# Patient Record
Sex: FEMALE | Race: OTHER | HISPANIC OR LATINO | URBAN - METROPOLITAN AREA
[De-identification: names, ages, dates, MRNs, and addresses within clinical notes are randomized per-mention and may not be internally consistent; named-entity substitution may affect disease eponyms.]

---

## 2018-12-10 ENCOUNTER — OUTPATIENT (OUTPATIENT)
Dept: OUTPATIENT SERVICES | Facility: HOSPITAL | Age: 43
LOS: 1 days | Discharge: HOME | End: 2018-12-10
Payer: COMMERCIAL

## 2018-12-10 DIAGNOSIS — R76.11 NONSPECIFIC REACTION TO TUBERCULIN SKIN TEST WITHOUT ACTIVE TUBERCULOSIS: ICD-10-CM

## 2018-12-10 PROCEDURE — 71046 X-RAY EXAM CHEST 2 VIEWS: CPT | Mod: 26

## 2019-06-27 ENCOUNTER — EMERGENCY (EMERGENCY)
Facility: HOSPITAL | Age: 44
LOS: 0 days | Discharge: HOME | End: 2019-06-28
Attending: EMERGENCY MEDICINE
Payer: COMMERCIAL

## 2019-06-27 VITALS
HEART RATE: 79 BPM | OXYGEN SATURATION: 100 % | DIASTOLIC BLOOD PRESSURE: 89 MMHG | RESPIRATION RATE: 18 BRPM | TEMPERATURE: 98 F | SYSTOLIC BLOOD PRESSURE: 122 MMHG | HEIGHT: 59 IN | WEIGHT: 164.91 LBS

## 2019-06-27 DIAGNOSIS — R10.32 LEFT LOWER QUADRANT PAIN: ICD-10-CM

## 2019-06-27 DIAGNOSIS — D25.9 LEIOMYOMA OF UTERUS, UNSPECIFIED: ICD-10-CM

## 2019-06-27 PROCEDURE — 99285 EMERGENCY DEPT VISIT HI MDM: CPT

## 2019-06-28 VITALS
TEMPERATURE: 98 F | SYSTOLIC BLOOD PRESSURE: 121 MMHG | DIASTOLIC BLOOD PRESSURE: 77 MMHG | OXYGEN SATURATION: 98 % | RESPIRATION RATE: 16 BRPM | HEART RATE: 61 BPM

## 2019-06-28 LAB
ALBUMIN SERPL ELPH-MCNC: 3.4 G/DL — SIGNIFICANT CHANGE UP (ref 3.3–5)
ALP SERPL-CCNC: 69 U/L — SIGNIFICANT CHANGE UP (ref 40–120)
ALT FLD-CCNC: 41 U/L — SIGNIFICANT CHANGE UP (ref 12–78)
ANION GAP SERPL CALC-SCNC: 7 MMOL/L — SIGNIFICANT CHANGE UP (ref 5–17)
APPEARANCE UR: CLEAR — SIGNIFICANT CHANGE UP
APTT BLD: 29.5 SEC — SIGNIFICANT CHANGE UP (ref 27.5–36.3)
AST SERPL-CCNC: 29 U/L — SIGNIFICANT CHANGE UP (ref 15–37)
BASOPHILS # BLD AUTO: 0.08 K/UL — SIGNIFICANT CHANGE UP (ref 0–0.2)
BASOPHILS NFR BLD AUTO: 0.9 % — SIGNIFICANT CHANGE UP (ref 0–2)
BILIRUB SERPL-MCNC: 0.4 MG/DL — SIGNIFICANT CHANGE UP (ref 0.2–1.2)
BILIRUB UR-MCNC: NEGATIVE — SIGNIFICANT CHANGE UP
BUN SERPL-MCNC: 8 MG/DL — SIGNIFICANT CHANGE UP (ref 7–23)
CALCIUM SERPL-MCNC: 8.2 MG/DL — LOW (ref 8.5–10.1)
CHLORIDE SERPL-SCNC: 108 MMOL/L — SIGNIFICANT CHANGE UP (ref 96–108)
CO2 SERPL-SCNC: 24 MMOL/L — SIGNIFICANT CHANGE UP (ref 22–31)
COLOR SPEC: YELLOW — SIGNIFICANT CHANGE UP
CREAT SERPL-MCNC: 0.66 MG/DL — SIGNIFICANT CHANGE UP (ref 0.5–1.3)
DIFF PNL FLD: NEGATIVE — SIGNIFICANT CHANGE UP
EOSINOPHIL # BLD AUTO: 0.26 K/UL — SIGNIFICANT CHANGE UP (ref 0–0.5)
EOSINOPHIL NFR BLD AUTO: 2.8 % — SIGNIFICANT CHANGE UP (ref 0–6)
GLUCOSE SERPL-MCNC: 98 MG/DL — SIGNIFICANT CHANGE UP (ref 70–99)
GLUCOSE UR QL: NEGATIVE MG/DL — SIGNIFICANT CHANGE UP
HCG SERPL-ACNC: <1 MIU/ML — SIGNIFICANT CHANGE UP
HCT VFR BLD CALC: 42.1 % — SIGNIFICANT CHANGE UP (ref 34.5–45)
HGB BLD-MCNC: 13.8 G/DL — SIGNIFICANT CHANGE UP (ref 11.5–15.5)
IMM GRANULOCYTES NFR BLD AUTO: 0.2 % — SIGNIFICANT CHANGE UP (ref 0–1.5)
INR BLD: 0.94 RATIO — SIGNIFICANT CHANGE UP (ref 0.88–1.16)
KETONES UR-MCNC: NEGATIVE — SIGNIFICANT CHANGE UP
LACTATE SERPL-SCNC: 2 MMOL/L — SIGNIFICANT CHANGE UP (ref 0.7–2)
LEUKOCYTE ESTERASE UR-ACNC: NEGATIVE — SIGNIFICANT CHANGE UP
LYMPHOCYTES # BLD AUTO: 4.07 K/UL — HIGH (ref 1–3.3)
LYMPHOCYTES # BLD AUTO: 44.5 % — HIGH (ref 13–44)
MCHC RBC-ENTMCNC: 30.3 PG — SIGNIFICANT CHANGE UP (ref 27–34)
MCHC RBC-ENTMCNC: 32.8 GM/DL — SIGNIFICANT CHANGE UP (ref 32–36)
MCV RBC AUTO: 92.3 FL — SIGNIFICANT CHANGE UP (ref 80–100)
MONOCYTES # BLD AUTO: 0.53 K/UL — SIGNIFICANT CHANGE UP (ref 0–0.9)
MONOCYTES NFR BLD AUTO: 5.8 % — SIGNIFICANT CHANGE UP (ref 2–14)
NEUTROPHILS # BLD AUTO: 4.19 K/UL — SIGNIFICANT CHANGE UP (ref 1.8–7.4)
NEUTROPHILS NFR BLD AUTO: 45.8 % — SIGNIFICANT CHANGE UP (ref 43–77)
NITRITE UR-MCNC: NEGATIVE — SIGNIFICANT CHANGE UP
NRBC # BLD: 0 /100 WBCS — SIGNIFICANT CHANGE UP (ref 0–0)
PH UR: 6 — SIGNIFICANT CHANGE UP (ref 5–8)
PLATELET # BLD AUTO: 236 K/UL — SIGNIFICANT CHANGE UP (ref 150–400)
POTASSIUM SERPL-MCNC: 3.8 MMOL/L — SIGNIFICANT CHANGE UP (ref 3.5–5.3)
POTASSIUM SERPL-SCNC: 3.8 MMOL/L — SIGNIFICANT CHANGE UP (ref 3.5–5.3)
PROT SERPL-MCNC: 7.1 GM/DL — SIGNIFICANT CHANGE UP (ref 6–8.3)
PROT UR-MCNC: NEGATIVE MG/DL — SIGNIFICANT CHANGE UP
PROTHROM AB SERPL-ACNC: 10.5 SEC — SIGNIFICANT CHANGE UP (ref 10–12.9)
RBC # BLD: 4.56 M/UL — SIGNIFICANT CHANGE UP (ref 3.8–5.2)
RBC # FLD: 13.2 % — SIGNIFICANT CHANGE UP (ref 10.3–14.5)
SODIUM SERPL-SCNC: 139 MMOL/L — SIGNIFICANT CHANGE UP (ref 135–145)
SP GR SPEC: 1.01 — SIGNIFICANT CHANGE UP (ref 1.01–1.02)
UROBILINOGEN FLD QL: NEGATIVE MG/DL — SIGNIFICANT CHANGE UP
WBC # BLD: 9.15 K/UL — SIGNIFICANT CHANGE UP (ref 3.8–10.5)
WBC # FLD AUTO: 9.15 K/UL — SIGNIFICANT CHANGE UP (ref 3.8–10.5)

## 2019-06-28 PROCEDURE — 74177 CT ABD & PELVIS W/CONTRAST: CPT | Mod: 26

## 2019-06-28 RX ORDER — MORPHINE SULFATE 50 MG/1
4 CAPSULE, EXTENDED RELEASE ORAL ONCE
Refills: 0 | Status: DISCONTINUED | OUTPATIENT
Start: 2019-06-28 | End: 2019-06-28

## 2019-06-28 RX ORDER — KETOROLAC TROMETHAMINE 30 MG/ML
30 SYRINGE (ML) INJECTION ONCE
Refills: 0 | Status: DISCONTINUED | OUTPATIENT
Start: 2019-06-28 | End: 2019-06-28

## 2019-06-28 RX ORDER — ONDANSETRON 8 MG/1
4 TABLET, FILM COATED ORAL ONCE
Refills: 0 | Status: COMPLETED | OUTPATIENT
Start: 2019-06-28 | End: 2019-06-28

## 2019-06-28 RX ADMIN — Medication 30 MILLIGRAM(S): at 03:00

## 2019-06-28 RX ADMIN — MORPHINE SULFATE 4 MILLIGRAM(S): 50 CAPSULE, EXTENDED RELEASE ORAL at 03:00

## 2019-06-28 RX ADMIN — ONDANSETRON 4 MILLIGRAM(S): 8 TABLET, FILM COATED ORAL at 02:47

## 2019-06-28 RX ADMIN — Medication 30 MILLIGRAM(S): at 02:47

## 2019-06-28 RX ADMIN — MORPHINE SULFATE 4 MILLIGRAM(S): 50 CAPSULE, EXTENDED RELEASE ORAL at 02:48

## 2019-06-28 NOTE — ED PROVIDER NOTE - CLINICAL SUMMARY MEDICAL DECISION MAKING FREE TEXT BOX
Pt VSS, labs & UA neg for acute pathology.  CT neg for acute pathology.  Discussed results and outcome of testing with the patient, given copy as well.  Patient advised to please follow up with their primary care doctor within the next 24 hours and return to the Emergency Department for worsening symptoms or any other concerns.  Patient advised that their doctor may call  to follow up on the specific results of the tests performed today in the emergency department.

## 2019-06-28 NOTE — ED PROVIDER NOTE - OBJECTIVE STATEMENT
Pertinent PMH/PSH/FHx/SHx and Review of Systems contained within:    44yo F w no significant PMH, hx of , presents to ED c/o L sided abd pain.  Pt states she feels burning pain over L side of abdomen & flank.  Denies fever, chills, vomiting, diarrhea.  Pt states she does feel constipated.  Denies hematuria, dysuria.  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    No fever/chills, No photophobia/eye pain/changes in vision, No ear pain/sore throat/dysphagia, No chest pain/palpitations, no SOB/cough/wheeze/stridor, +abdominal pain, No neck/back pain, no rash, no changes in neurological status/function.

## 2019-06-28 NOTE — ED PROVIDER NOTE - PHYSICAL EXAMINATION
Gen: Alert, c/o pain  Head: NC, AT, EOMI, normal lids/conjunctiva  ENT: normal hearing, patent oropharynx, MMM  Neck: supple, no tenderness/meningismus, FROM, Trachea midline  Pulm: Bilateral clear BS, normal resp effort, no wheeze/stridor/retractions  CV: RRR, no M/R/G, +dist pulses  Abd: soft, +TTP over LUQ & LLQ, ND, +BS, no guarding/rebound tenderness  Mskel: no edema/erythema/cyanosis  Skin: no rash  Neuro: no sensory/motor deficits

## 2019-06-29 LAB
CULTURE RESULTS: SIGNIFICANT CHANGE UP
SPECIMEN SOURCE: SIGNIFICANT CHANGE UP

## 2019-10-28 ENCOUNTER — APPOINTMENT (OUTPATIENT)
Dept: RADIOLOGY | Facility: HOSPITAL | Age: 44
End: 2019-10-28

## 2019-10-28 ENCOUNTER — OUTPATIENT (OUTPATIENT)
Dept: OUTPATIENT SERVICES | Facility: HOSPITAL | Age: 44
LOS: 1 days | Discharge: HOME | End: 2019-10-28
Payer: COMMERCIAL

## 2019-10-28 ENCOUNTER — APPOINTMENT (OUTPATIENT)
Dept: ULTRASOUND IMAGING | Facility: HOSPITAL | Age: 44
End: 2019-10-28

## 2019-10-28 DIAGNOSIS — Z12.11 ENCOUNTER FOR SCREENING FOR MALIGNANT NEOPLASM OF COLON: ICD-10-CM

## 2019-10-28 DIAGNOSIS — R10.9 UNSPECIFIED ABDOMINAL PAIN: ICD-10-CM

## 2019-10-28 PROCEDURE — 76700 US EXAM ABDOM COMPLETE: CPT | Mod: 26

## 2019-10-28 PROCEDURE — 74270 X-RAY XM COLON 1CNTRST STD: CPT | Mod: 26

## 2020-03-29 ENCOUNTER — EMERGENCY (EMERGENCY)
Facility: HOSPITAL | Age: 45
LOS: 0 days | Discharge: HOME | End: 2020-03-29
Payer: MEDICAID

## 2020-03-29 VITALS
HEIGHT: 62 IN | TEMPERATURE: 99 F | WEIGHT: 145.06 LBS | SYSTOLIC BLOOD PRESSURE: 133 MMHG | HEART RATE: 84 BPM | OXYGEN SATURATION: 99 % | DIASTOLIC BLOOD PRESSURE: 96 MMHG | RESPIRATION RATE: 16 BRPM

## 2020-03-29 DIAGNOSIS — R05 COUGH: ICD-10-CM

## 2020-03-29 DIAGNOSIS — J02.9 ACUTE PHARYNGITIS, UNSPECIFIED: ICD-10-CM

## 2020-03-29 DIAGNOSIS — M54.9 DORSALGIA, UNSPECIFIED: ICD-10-CM

## 2020-03-29 PROCEDURE — 99283 EMERGENCY DEPT VISIT LOW MDM: CPT

## 2020-03-29 RX ORDER — AZITHROMYCIN 500 MG/1
1 TABLET, FILM COATED ORAL
Qty: 6 | Refills: 0
Start: 2020-03-29

## 2020-03-29 NOTE — ED PROVIDER NOTE - CLINICAL SUMMARY MEDICAL DECISION MAKING FREE TEXT BOX
Lance Watkins, f/u w/PMD Zpack, Tylenol, f/u w/PMD  Pt is told the likelihood of having corona virus is high risk  base on symptoms. And to self quarantine for 14 days. Take tylenols prn for fever or pain, albuterol prn for chest tightness, zpack is use for clinical trail currently for treatement of covid19.

## 2020-03-29 NOTE — ED PROVIDER NOTE - PATIENT PORTAL LINK FT
You can access the FollowMyHealth Patient Portal offered by [facility] by registering at the following website: [email or web address]. By joining [employer]’s FollowMyHealth portal, you will also be able to view your health information using other applications (apps) compatible with our system.

## 2020-03-29 NOTE — ED PROVIDER NOTE - OBJECTIVE STATEMENT
Pt is a 44 year old female w/no significant PMH who presents to the ED today for a fever. Pt c/o of body aches. Denies LOC, N/V/D, dizziness, back pain or abdominal pain. Patient denies EtOH/tobacco/illicit substance use. Pt is a 44 year old female w/no significant PMH who presents to the ED today for back pain. Pt c/o sore throat and body ache. Pt reports having minor surgery x 1 week ago. Patient denies EtOH/tobacco/illicit substance use. Pt is a 44 year old female w/no significant PMH who presents to the ED today for back pain. Pt c/o sore throat and body ache. Pt reports having minor surgery x 1 week ago. Patient denies EtOH/tobacco/illicit substance use.  [employer] interper used [id number]

## 2020-12-03 NOTE — ED ADULT NURSE NOTE - CHPI ED NUR SYMPTOMS POS
Notify patient that I have sent an antibiotic to pharmacy.  He has a work note in his electronic medical record.  May return to work on December 7, 2020.  We will still need to isolate/quarantine through the weekend.   NAUSEA/VOMITING

## 2021-03-23 PROBLEM — Z00.00 ENCOUNTER FOR PREVENTIVE HEALTH EXAMINATION: Status: ACTIVE | Noted: 2021-03-23

## 2021-03-29 ENCOUNTER — APPOINTMENT (OUTPATIENT)
Dept: SURGERY | Facility: CLINIC | Age: 46
End: 2021-03-29
Payer: COMMERCIAL

## 2021-03-29 VITALS
HEIGHT: 62 IN | HEART RATE: 87 BPM | SYSTOLIC BLOOD PRESSURE: 131 MMHG | BODY MASS INDEX: 30.55 KG/M2 | DIASTOLIC BLOOD PRESSURE: 84 MMHG | OXYGEN SATURATION: 99 % | TEMPERATURE: 98.1 F | WEIGHT: 166 LBS

## 2021-03-29 DIAGNOSIS — K82.9 DISEASE OF GALLBLADDER, UNSPECIFIED: ICD-10-CM

## 2021-03-29 DIAGNOSIS — Z86.11 PERSONAL HISTORY OF TUBERCULOSIS: ICD-10-CM

## 2021-03-29 DIAGNOSIS — Z78.9 OTHER SPECIFIED HEALTH STATUS: ICD-10-CM

## 2021-03-29 PROCEDURE — 99204 OFFICE O/P NEW MOD 45 MIN: CPT

## 2021-03-29 PROCEDURE — 99072 ADDL SUPL MATRL&STAF TM PHE: CPT

## 2021-04-01 PROBLEM — Z86.11 HISTORY OF TUBERCULOSIS: Status: RESOLVED | Noted: 2021-03-29 | Resolved: 2021-04-01

## 2021-04-01 PROBLEM — Z78.9 NON-SMOKER: Status: ACTIVE | Noted: 2021-03-29

## 2021-04-01 RX ORDER — BISACODYL 5 MG/1
5 TABLET ORAL
Refills: 0 | Status: ACTIVE | COMMUNITY

## 2021-04-06 PROBLEM — K82.9 GALLBLADDER DISEASE: Status: ACTIVE | Noted: 2021-04-06

## 2021-04-06 NOTE — CONSULT LETTER
[Dear  ___] : Dear  [unfilled], [Consult Letter:] : I had the pleasure of evaluating your patient, [unfilled]. [Please see my note below.] : Please see my note below. [Consult Closing:] : Thank you very much for allowing me to participate in the care of this patient.  If you have any questions, please do not hesitate to contact me. [Sincerely,] : Sincerely, [FreeTextEntry3] : Devyn Matias MD, FACS

## 2021-04-06 NOTE — HISTORY OF PRESENT ILLNESS
[de-identified] :   Ms. RADHA ONEIL is a 45 year  old patient who was referred by Dr. Deidra Dc with the chief complaint of having  epigastric pain for 3-4 years. Pain is  radiating to the back. She reports no nausea or vomiting and no history of jaundice, acholia or choluria.   Appetite is good and weight is stable.   She   has  family history of biliary tract disease in her father, mother and uncle.    She had an abdominal sonogram on  03/21/2020 which did not  reveal  GB stones. CBD is normal . patient also had abdominal US on 09/26/2020 that showed fatty liver. GB was normal.  EGD An colonoscopy  was done on 08/20/2018. EGD  showed erythematous mucosa and colonoscopy showed hemorrhoids, polyps and diverticulosis.

## 2021-04-06 NOTE — DATA REVIEWED
[FreeTextEntry1] : RADHA LAZAR\par Date of Birth\par 1975\par Procedure\par US ABDOMEN\par Study Date & Time\par 2020-03-21 2:09 PM\par Patient ID\par [id number]\par Accession Number\par [id number]\par Referring Physician\par RONALD WEINSTEIN\par Institution Name\par [facility]\par Report\par RADIOLOGY REPORT\par FINDINGS\par FINDING\par US ABDOMEN\par History: Abdominal pain.\par Comparison: None available.\par Technique: High resolution gray-scale sonographic evaluation of the abdomen was performed.\par FINDINGS:\par The liver is normal in size. No focal hepatic mass is identified. Liver parenchyma is echogenic.\par The gallbladder is distended without gallstones, gallbladder wall thickening or pericholecystic\par fluid.\par The common bile duct measures 4 mm, which is of normal caliber.\par The spleen measures 7.9 cm in length, which is normal in size.\par The right kidney measures 10.7 cm length. No evidence of hydronephrosis, perinephric fluid or renal\par calculus is noted.\par The left kidney measures 11.5 cm length. No evidence of hydronephrosis, perinephric fluid or renal\par calculus is noted.\par The visualized portion of the pancreas are within normal limits. The visualized portion of the\par abdominal aorta and IVC are unremarkable.\par IMPRESSION:\par Liver parenchyma is echogenic, which may be secondary to fatty infiltration and/or hepatic\par parenchymal disease.\par Otherwise unremarkable exam.\par Electronically signed by: Magdiel Watts MD 3/22/2020 8:29 AM\par  \par Electronically Signed By: Magdiel Watts MD\par Sign Date: 22-MAR-20[facility]

## 2021-04-06 NOTE — PLAN
[FreeTextEntry1] : Ms. JANEL ONEIL  was told significance of findings, options, risks and benefits were explained.    All surgical options were discussed including non-surgical treatments.  she was advise to repeat US and return after the test. \par Patient advised to seek immediate medical attention with any acute change in symptoms or with the development of any new or worsening symptoms.  Patient's questions and concerns addressed to patient's satisfaction, and patient verbalized an understanding of the information discussed.\par \par

## 2021-04-06 NOTE — PHYSICAL EXAM
[Alert] : alert [Oriented to Person] : oriented to person [Oriented to Place] : oriented to place [Oriented to Time] : oriented to time [Calm] : calm [Abdominal Masses] : No abdominal masses [Abdomen Tenderness] : ~T ~M No abdominal tenderness [de-identified] : She  is alert, well-groomed, and in NAD\par   [de-identified] : anicteric.  Nasal mucosa pink, septum midline. Oral mucosa pink.  Tongue midline, Pharynx without exudates.\par   [de-identified] : Neck supple. Trachea midline. Thyroid isthmus barely palpable, lobes not felt.\par   [de-identified] : midline scar

## 2021-04-12 ENCOUNTER — APPOINTMENT (OUTPATIENT)
Dept: SURGERY | Facility: CLINIC | Age: 46
End: 2021-04-12
Payer: COMMERCIAL

## 2021-04-12 VITALS
HEART RATE: 73 BPM | HEIGHT: 62 IN | OXYGEN SATURATION: 99 % | TEMPERATURE: 97.9 F | WEIGHT: 166 LBS | SYSTOLIC BLOOD PRESSURE: 139 MMHG | DIASTOLIC BLOOD PRESSURE: 89 MMHG | BODY MASS INDEX: 30.55 KG/M2

## 2021-04-12 PROCEDURE — 99213 OFFICE O/P EST LOW 20 MIN: CPT

## 2021-04-12 PROCEDURE — 99072 ADDL SUPL MATRL&STAF TM PHE: CPT

## 2021-04-12 NOTE — HISTORY OF PRESENT ILLNESS
[de-identified] :   Ms. RADHA ONEIL is a 45 year old patient who was referred by Dr. Deidra Dc with the chief complaint of having epigastric pain for 3-4 years. Pain is radiating to the back. She had an abdominal sonogram on  03/21/2020 which did not  reveal  GB stones. CBD is normal.  patient also had abdominal US on 09/26/2020 that showed fatty liver. GB was normal. Ms. JANEL ONEIL  is s/p abdominal US on 04/02/2021  and it showed Echogenic liver suggesting fatty infiltration versus hepatocellular disease.   Contracted gallbladder without evidence of calculus or wall thickening. patient continues to have abdominal pain. \par

## 2021-04-12 NOTE — PLAN
[FreeTextEntry1] : Ms. JANEL ONEIL  is presenting  today for an evaluation .   Results of  her recent  imaging and physical examination findings were discussed in details.   She  was advised to have HIDA scan and return after the tests.    Patient's questions and concerns addressed to patient's satisfaction.\par

## 2021-04-12 NOTE — PHYSICAL EXAM
[Abdominal Masses] : No abdominal masses [Abdomen Tenderness] : ~T ~M No abdominal tenderness [Alert] : alert [Oriented to Person] : oriented to person [Oriented to Place] : oriented to place [Oriented to Time] : oriented to time [Calm] : calm [de-identified] : She  is alert, well-groomed, and in NAD\par   [de-identified] : anicteric.  Nasal mucosa pink, septum midline. Oral mucosa pink.  Tongue midline, Pharynx without exudates.\par   [de-identified] : Neck supple. Trachea midline. Thyroid isthmus barely palpable, lobes not felt.\par   [de-identified] : midline scar

## 2021-04-12 NOTE — DATA REVIEWED
[FreeTextEntry1] : RADHA LAZAR\par Date of Birth\par 1975\par Procedure\par US ABDOMEN\par Study Date & Time\par 2021-04-02 3:26 PM\par Patient ID\par [id number]\par Accession Number\par [id number]\par Referring Physician\par DORINDA JACINTO\par Institution Name\par [facility]\par Report\par RADIOLOGY REPORT\par FINDINGS\par FINDING\par Ultrasound of the Abdomen.\par Reason for examination : Unspecified abdominal pain\par Comparison: Ultrasound 3/21/2020\par Sonographic evaluation of the upper abdomen reveals :\par MEASUREMENTS:\par CBD:3 mm\par SPLEEN:8.0 cm\par RIGHT KIDNEY:10.8 cm\par LEFT KIDNEY:11.4 cm\par LIVER: Mildly echogenic liver without evidence of focal mass .\par GALL BLADDER : Contracted gallbladder without evidence of calculus or wall thickening.\par BILE DUCTS: Without dilatation.\par PANCREAS : Limited evaluation secondary to overlying bowel gas.\par SPLEEN: Unremarkable.\par KIDNEYS: There is no calculus or hydronephrosis.\par ASCITES : None\par VASCULAR : Proximal aorta and IVC are unremarkable.\par IMPRESSION:\par  \par 1. Echogenic liver suggesting fatty infiltration versus hepatocellular disease.\par Electronically signed by: Husam Cassidy MD 4/2/2021 5:12 PM\par Workstation: [id number]\par Electronically Signed By: Husam Cassidy MD\par Sign Date: 02-APR-21\par [facility]

## 2021-04-12 NOTE — REVIEW OF SYSTEMS
[Abdominal Pain] : abdominal pain [Vomiting] : no vomiting [Constipation] : constipation [Diarrhea] : no diarrhea [Heartburn] : no heartburn [Melena] : no melena [Negative] : Heme/Lymph

## 2021-06-07 ENCOUNTER — APPOINTMENT (OUTPATIENT)
Dept: SURGERY | Facility: CLINIC | Age: 46
End: 2021-06-07
Payer: COMMERCIAL

## 2021-06-07 VITALS
SYSTOLIC BLOOD PRESSURE: 124 MMHG | BODY MASS INDEX: 30.55 KG/M2 | TEMPERATURE: 97.9 F | DIASTOLIC BLOOD PRESSURE: 84 MMHG | HEART RATE: 70 BPM | WEIGHT: 166 LBS | HEIGHT: 62 IN | OXYGEN SATURATION: 99 %

## 2021-06-07 PROCEDURE — 99213 OFFICE O/P EST LOW 20 MIN: CPT

## 2021-06-07 NOTE — PLAN
[FreeTextEntry1] : Ms. JANEL ONEIL  was told significance of findings, options, risks and benefits were explained.  Informed consent for laparoscopic/possible open  cholecystectomy  and potential risks, benefits and alternatives (surgical options were discussed including non-surgical options or the option of no surgery) to the planned surgery were discussed in depth.  All surgical options were discussed including non-surgical treatments.  She wishes to proceed with surgery.  We will plan for surgery on at the next available date, pending any required insurance pre-certification or pre-approval. She agrees to obtain any necessary pre-operative evaluations and testing prior to surgery. Patient instructed to maintain a fat-free diet, and to seek immediate medical attention with any acute change or worsening of symptoms, including but not limited to abdominal pain, fever, chills, nausea, vomiting, or yellowing of the skin. \par Patient advised to seek immediate medical attention with any acute change in symptoms or with the development of any new or worsening symptoms.  Patient's questions and concerns addressed to patient's satisfaction, and patient verbalized an understanding of the information discussed.

## 2021-06-07 NOTE — HISTORY OF PRESENT ILLNESS
[de-identified] :   Ms. RADHA ONEIL is a 45 year old patient who was referred by Dr. Deidra Dc with the chief complaint of having epigastric pain for 3-4 years. Pain is radiating to the back. She had an abdominal sonogram on  03/21/2020 which did not  reveal  GB stones. CBD is normal.  patient also had abdominal US on 09/26/2020 that showed fatty liver. GB was normal. Ms. JANEL ONEIL  is s/p abdominal US on 04/02/2021  and it showed Echogenic liver suggesting fatty infiltration versus hepatocellular disease. GB was normal. Ms. JANEL ONEIL  is s/p HIDA scan with EF on 05/11/2021. results showed 32.2 % EF. Normal value  is more or equal 38 % at 60 minutes.  patient continues to have right upper quadrant and epigastric pain \par

## 2021-06-07 NOTE — PHYSICAL EXAM
[Abdominal Masses] : No abdominal masses [Abdomen Tenderness] : ~T ~M No abdominal tenderness [Alert] : alert [Oriented to Person] : oriented to person [Oriented to Place] : oriented to place [Oriented to Time] : oriented to time [Calm] : calm [de-identified] : She  is alert, well-groomed, and in NAD\par   [de-identified] : anicteric.  Nasal mucosa pink, septum midline. Oral mucosa pink.  Tongue midline, Pharynx without exudates.\par   [de-identified] : Neck supple. Trachea midline. Thyroid isthmus barely palpable, lobes not felt.\par   [de-identified] : midline scar

## 2021-06-18 DIAGNOSIS — Z01.818 ENCOUNTER FOR OTHER PREPROCEDURAL EXAMINATION: ICD-10-CM

## 2021-06-19 ENCOUNTER — APPOINTMENT (OUTPATIENT)
Dept: DISASTER EMERGENCY | Facility: CLINIC | Age: 46
End: 2021-06-19

## 2021-06-21 ENCOUNTER — TRANSCRIPTION ENCOUNTER (OUTPATIENT)
Age: 46
End: 2021-06-21

## 2021-06-21 ENCOUNTER — OUTPATIENT (OUTPATIENT)
Dept: OUTPATIENT SERVICES | Facility: HOSPITAL | Age: 46
LOS: 1 days | End: 2021-06-21
Payer: COMMERCIAL

## 2021-06-21 VITALS
TEMPERATURE: 99 F | RESPIRATION RATE: 16 BRPM | HEIGHT: 61 IN | OXYGEN SATURATION: 99 % | HEART RATE: 81 BPM | DIASTOLIC BLOOD PRESSURE: 81 MMHG | SYSTOLIC BLOOD PRESSURE: 119 MMHG | WEIGHT: 162.04 LBS

## 2021-06-21 DIAGNOSIS — Z01.818 ENCOUNTER FOR OTHER PREPROCEDURAL EXAMINATION: ICD-10-CM

## 2021-06-21 DIAGNOSIS — Z98.891 HISTORY OF UTERINE SCAR FROM PREVIOUS SURGERY: Chronic | ICD-10-CM

## 2021-06-21 DIAGNOSIS — K82.8 OTHER SPECIFIED DISEASES OF GALLBLADDER: ICD-10-CM

## 2021-06-21 DIAGNOSIS — Z29.9 ENCOUNTER FOR PROPHYLACTIC MEASURES, UNSPECIFIED: ICD-10-CM

## 2021-06-21 DIAGNOSIS — Z98.890 OTHER SPECIFIED POSTPROCEDURAL STATES: Chronic | ICD-10-CM

## 2021-06-21 LAB — BLD GP AB SCN SERPL QL: SIGNIFICANT CHANGE UP

## 2021-06-21 PROCEDURE — 47563 LAPARO CHOLECYSTECTOMY/GRAPH: CPT

## 2021-06-21 PROCEDURE — G0463: CPT

## 2021-06-21 NOTE — H&P PST ADULT - NSICDXPROBLEM_GEN_ALL_CORE_FT
PROBLEM DIAGNOSES  Problem: Other specified diseases of gallbladder  Assessment and Plan: Laparoscopic Cholecystectomy with Intra-Operative Cholangiogram  Possible Open    Problem: Need for prophylactic measure  Assessment and Plan: Instruction regarding chlorhexidine soap use is given.  Patient verbalized understanding.  Literature also given

## 2021-06-21 NOTE — H&P PST ADULT - ASSESSMENT
44 yo female is scheduled for : Laparoscopic Cholecystectomy with Intra-Operative Cholangiogram  Possible Open, on 6/22/21

## 2021-06-21 NOTE — H&P PST ADULT - NSICDXFAMILYHX_GEN_ALL_CORE_FT
FAMILY HISTORY:  Father  Still living? Yes, Estimated age: Age Unknown  FH: arthritis, Age at diagnosis: Age Unknown

## 2021-06-21 NOTE — H&P PST ADULT - REASON FOR ADMISSION
I have had pain in the right upper quadrant of my abdomen for approximately 2 months, associated with nausea

## 2021-06-21 NOTE — H&P PST ADULT - HISTORY OF PRESENT ILLNESS
46 yo female with history of HLD, Seasonal Allergies, Arthritis, Gastritis, reports the above.  She is scheduled for : Laparoscopic Cholecystectomy with Intra-Operative Cholangiogram  Possible Open, on 6/22/21

## 2021-06-22 ENCOUNTER — OUTPATIENT (OUTPATIENT)
Dept: OUTPATIENT SERVICES | Facility: HOSPITAL | Age: 46
LOS: 1 days | End: 2021-06-22
Payer: COMMERCIAL

## 2021-06-22 ENCOUNTER — APPOINTMENT (OUTPATIENT)
Dept: SURGERY | Facility: HOSPITAL | Age: 46
End: 2021-06-22
Payer: COMMERCIAL

## 2021-06-22 ENCOUNTER — RESULT REVIEW (OUTPATIENT)
Age: 46
End: 2021-06-22

## 2021-06-22 VITALS
DIASTOLIC BLOOD PRESSURE: 75 MMHG | TEMPERATURE: 97 F | OXYGEN SATURATION: 99 % | SYSTOLIC BLOOD PRESSURE: 113 MMHG | RESPIRATION RATE: 16 BRPM | WEIGHT: 162.04 LBS | HEART RATE: 70 BPM | HEIGHT: 61 IN

## 2021-06-22 VITALS
SYSTOLIC BLOOD PRESSURE: 125 MMHG | RESPIRATION RATE: 16 BRPM | OXYGEN SATURATION: 100 % | DIASTOLIC BLOOD PRESSURE: 77 MMHG | TEMPERATURE: 98 F | HEART RATE: 65 BPM

## 2021-06-22 DIAGNOSIS — Z98.891 HISTORY OF UTERINE SCAR FROM PREVIOUS SURGERY: Chronic | ICD-10-CM

## 2021-06-22 DIAGNOSIS — K82.8 OTHER SPECIFIED DISEASES OF GALLBLADDER: ICD-10-CM

## 2021-06-22 DIAGNOSIS — Z98.890 OTHER SPECIFIED POSTPROCEDURAL STATES: Chronic | ICD-10-CM

## 2021-06-22 LAB
ABO RH CONFIRMATION: SIGNIFICANT CHANGE UP
HCG UR QL: NEGATIVE — SIGNIFICANT CHANGE UP

## 2021-06-22 PROCEDURE — 88304 TISSUE EXAM BY PATHOLOGIST: CPT

## 2021-06-22 PROCEDURE — 81025 URINE PREGNANCY TEST: CPT

## 2021-06-22 PROCEDURE — 47563 LAPARO CHOLECYSTECTOMY/GRAPH: CPT

## 2021-06-22 PROCEDURE — 88304 TISSUE EXAM BY PATHOLOGIST: CPT | Mod: 26

## 2021-06-22 PROCEDURE — 36415 COLL VENOUS BLD VENIPUNCTURE: CPT

## 2021-06-22 PROCEDURE — 47563 LAPARO CHOLECYSTECTOMY/GRAPH: CPT | Mod: AS

## 2021-06-22 PROCEDURE — 76000 FLUOROSCOPY <1 HR PHYS/QHP: CPT

## 2021-06-22 RX ORDER — SODIUM CHLORIDE 9 MG/ML
3 INJECTION INTRAMUSCULAR; INTRAVENOUS; SUBCUTANEOUS EVERY 8 HOURS
Refills: 0 | Status: DISCONTINUED | OUTPATIENT
Start: 2021-06-22 | End: 2021-06-22

## 2021-06-22 RX ORDER — FENTANYL CITRATE 50 UG/ML
50 INJECTION INTRAVENOUS
Refills: 0 | Status: DISCONTINUED | OUTPATIENT
Start: 2021-06-22 | End: 2021-06-22

## 2021-06-22 RX ORDER — OXYCODONE AND ACETAMINOPHEN 5; 325 MG/1; MG/1
1 TABLET ORAL ONCE
Refills: 0 | Status: DISCONTINUED | OUTPATIENT
Start: 2021-06-22 | End: 2021-06-22

## 2021-06-22 RX ORDER — ONDANSETRON 8 MG/1
4 TABLET, FILM COATED ORAL ONCE
Refills: 0 | Status: DISCONTINUED | OUTPATIENT
Start: 2021-06-22 | End: 2021-06-29

## 2021-06-22 RX ORDER — FENTANYL CITRATE 50 UG/ML
25 INJECTION INTRAVENOUS
Refills: 0 | Status: DISCONTINUED | OUTPATIENT
Start: 2021-06-22 | End: 2021-06-22

## 2021-06-22 RX ADMIN — FENTANYL CITRATE 50 MICROGRAM(S): 50 INJECTION INTRAVENOUS at 11:01

## 2021-06-22 RX ADMIN — SODIUM CHLORIDE 3 MILLILITER(S): 9 INJECTION INTRAMUSCULAR; INTRAVENOUS; SUBCUTANEOUS at 08:01

## 2021-06-22 RX ADMIN — FENTANYL CITRATE 25 MICROGRAM(S): 50 INJECTION INTRAVENOUS at 11:10

## 2021-06-22 RX ADMIN — OXYCODONE AND ACETAMINOPHEN 1 TABLET(S): 5; 325 TABLET ORAL at 12:50

## 2021-06-22 RX ADMIN — FENTANYL CITRATE 50 MICROGRAM(S): 50 INJECTION INTRAVENOUS at 10:46

## 2021-06-22 RX ADMIN — OXYCODONE AND ACETAMINOPHEN 1 TABLET(S): 5; 325 TABLET ORAL at 12:20

## 2021-06-22 RX ADMIN — FENTANYL CITRATE 25 MICROGRAM(S): 50 INJECTION INTRAVENOUS at 11:25

## 2021-06-22 NOTE — ASU DISCHARGE PLAN (ADULT/PEDIATRIC) - CALL YOUR DOCTOR IF YOU HAVE ANY OF THE FOLLOWING:
Bleeding that does not stop/Swelling that gets worse/Pain not relieved by Medications/Fever greater than (need to indicate Fahrenheit or Celsius) Bleeding that does not stop/Swelling that gets worse/Pain not relieved by Medications/Fever greater than (need to indicate Fahrenheit or Celsius)/Wound/Surgical Site with redness, or foul smelling discharge or pus

## 2021-06-22 NOTE — BRIEF OPERATIVE NOTE - NSICDXBRIEFPROCEDURE_GEN_ALL_CORE_FT
PROCEDURES:  Laparoscopic cholecystectomy with cholangiography 22-Jun-2021 10:20:32  Ilda Escalera

## 2021-06-22 NOTE — PACU DISCHARGE NOTE - COMMENTS
pt with original complaints of diffuse abdominal, back and lower chest pain with inspiration. Now without pain, likely secondary to insufflation. Meets discharge criteria

## 2021-06-22 NOTE — ASU DISCHARGE PLAN (ADULT/PEDIATRIC) - CARE PROVIDER_API CALL
Devyn Matias)  Surgery  [address] [facility], [place] [address]  Phone: [phone]  Fax: [phone]  Follow Up Time:

## 2021-06-25 LAB — SURGICAL PATHOLOGY STUDY: SIGNIFICANT CHANGE UP

## 2021-07-08 PROBLEM — K82.8 DYSKINESIA OF GALLBLADDER: Status: ACTIVE | Noted: 2021-06-03

## 2021-07-12 ENCOUNTER — APPOINTMENT (OUTPATIENT)
Dept: SURGERY | Facility: CLINIC | Age: 46
End: 2021-07-12
Payer: COMMERCIAL

## 2021-07-12 DIAGNOSIS — K82.8 OTHER SPECIFIED DISEASES OF GALLBLADDER: ICD-10-CM

## 2021-07-12 PROCEDURE — 99024 POSTOP FOLLOW-UP VISIT: CPT

## 2021-07-12 NOTE — PHYSICAL EXAM
[Calm] : calm [de-identified] : She  is alert, well-groomed, and in NAD\par   [de-identified] : Surgical wounds are  healing well.   no signs of  inflammation or infection.

## 2021-07-12 NOTE — DATA REVIEWED
[FreeTextEntry1] : ZAMBRANOGARCIA, RADHA L                2\par \par \par \par Surgical Final Report\par \par \par \par \par Final Diagnosis\par \par Gallbladder; laparoscopic cholecystectomy:\par - Chronic cholecystitis.\par \par Verified by: Jazmin Wolf MD\par (Electronic Signature)\par Reported on: 06/25/21 15:25 EDT, [facility],\par [address] [facility], [place] [address]\par Phone: [phone]   Fax: [phone]\par _________________________________________________________________\par \par Clinical History\par Cholecystitis\par \par Specimen(s) Submitted\par 1     Gallbladder

## 2021-07-12 NOTE — HISTORY OF PRESENT ILLNESS
[de-identified] : Ms. JANEL ONEIL  is s/p laparoscopic  cholecystectomy  on 06/22/2021. Today Ms. JANEL ONEIL offers no complaints. patient reports no fever, chills,  or  pain. Her  surgical wounds are  healing well. No signs of inflammation, infection or exudate. she reports occasional constipations.

## 2021-07-12 NOTE — PLAN
[FreeTextEntry1] : Ms. JANEL ONEIL will follow up  if needed. Warning signs, follow up, and restrictions were discussed with the patient. \par \par follow up with GI specialist for constipations. increase fiber intake and hydration

## 2021-07-12 NOTE — ASSESSMENT
[FreeTextEntry1] : Ms. JANEL ONEIL is doing well, with excellent post-operative recovery. All surgical incisions are healing well and as expected. There is no evidence of infection or complication, and she is progressing as expected. Post-operative wound care, activity, restrictions and precautions reinforced. Patient instructed to refrain from any heavy lifting greater than 10-15 pounds for at least 4 weeks post-operatively. pathology results were discussed in details.  Patient's questions and concerns addressed to patient's satisfaction.

## 2021-08-13 PROBLEM — Z87.19 PERSONAL HISTORY OF OTHER DISEASES OF THE DIGESTIVE SYSTEM: Chronic | Status: ACTIVE | Noted: 2021-06-21

## 2021-08-13 PROBLEM — J30.2 OTHER SEASONAL ALLERGIC RHINITIS: Chronic | Status: ACTIVE | Noted: 2021-06-21

## 2021-08-13 PROBLEM — Z78.9 OTHER SPECIFIED HEALTH STATUS: Chronic | Status: INACTIVE | Noted: 2019-06-28 | Resolved: 2021-06-21

## 2022-06-27 ENCOUNTER — APPOINTMENT (OUTPATIENT)
Dept: SURGERY | Facility: CLINIC | Age: 47
End: 2022-06-27
Payer: COMMERCIAL

## 2022-06-27 VITALS
HEART RATE: 61 BPM | BODY MASS INDEX: 31.65 KG/M2 | WEIGHT: 172 LBS | DIASTOLIC BLOOD PRESSURE: 86 MMHG | SYSTOLIC BLOOD PRESSURE: 132 MMHG | HEIGHT: 62 IN

## 2022-06-27 VITALS — TEMPERATURE: 97.3 F

## 2022-06-27 DIAGNOSIS — M19.90 UNSPECIFIED OSTEOARTHRITIS, UNSPECIFIED SITE: ICD-10-CM

## 2022-06-27 DIAGNOSIS — M41.9 SCOLIOSIS, UNSPECIFIED: ICD-10-CM

## 2022-06-27 PROCEDURE — 99213 OFFICE O/P EST LOW 20 MIN: CPT

## 2022-06-27 RX ORDER — PANTOPRAZOLE SODIUM 20 MG/1
TABLET, DELAYED RELEASE ORAL
Refills: 0 | Status: ACTIVE | COMMUNITY

## 2022-06-27 RX ORDER — CLOTRIMAZOLE 10 MG/G
1 CREAM TOPICAL
Qty: 30 | Refills: 0 | Status: ACTIVE | COMMUNITY
Start: 2022-05-04

## 2022-06-27 RX ORDER — LORATADINE 10 MG/1
10 TABLET ORAL
Qty: 30 | Refills: 0 | Status: ACTIVE | COMMUNITY
Start: 2022-06-01

## 2022-06-27 RX ORDER — FLUTICASONE PROPIONATE 50 UG/1
50 SPRAY, METERED NASAL
Qty: 16 | Refills: 0 | Status: ACTIVE | COMMUNITY
Start: 2022-02-19

## 2022-06-27 RX ORDER — UREA 10 %
50 LOTION (ML) TOPICAL
Qty: 30 | Refills: 0 | Status: ACTIVE | COMMUNITY
Start: 2022-05-04

## 2022-06-27 RX ORDER — OXYBUTYNIN CHLORIDE 15 MG/1
15 TABLET, EXTENDED RELEASE ORAL
Qty: 30 | Refills: 0 | Status: ACTIVE | COMMUNITY
Start: 2022-05-04

## 2022-06-27 RX ORDER — WHEAT DEXTRIN 3 G/4 G
POWDER (GRAM) ORAL
Refills: 0 | Status: ACTIVE | COMMUNITY

## 2022-06-27 RX ORDER — DOXEPIN HYDROCHLORIDE 100 MG/1
100 CAPSULE ORAL
Qty: 108 | Refills: 0 | Status: ACTIVE | COMMUNITY
Start: 2022-04-20

## 2022-06-27 RX ORDER — ALBUTEROL SULFATE 90 UG/1
108 (90 BASE) INHALANT RESPIRATORY (INHALATION)
Qty: 8 | Refills: 0 | Status: ACTIVE | COMMUNITY
Start: 2022-03-30

## 2022-06-27 RX ORDER — MONTELUKAST SODIUM 10 MG/1
TABLET, FILM COATED ORAL
Refills: 0 | Status: ACTIVE | COMMUNITY

## 2022-06-27 RX ORDER — OXYBUTYNIN CHLORIDE 10 MG/1
10 TABLET, EXTENDED RELEASE ORAL
Qty: 30 | Refills: 0 | Status: ACTIVE | COMMUNITY
Start: 2022-01-29

## 2022-06-27 RX ORDER — CHLORTHALIDONE 25 MG/1
25 TABLET ORAL
Qty: 90 | Refills: 0 | Status: ACTIVE | COMMUNITY
Start: 2022-02-03

## 2022-06-27 RX ORDER — FAMOTIDINE 40 MG/1
40 TABLET, FILM COATED ORAL
Qty: 30 | Refills: 0 | Status: ACTIVE | COMMUNITY
Start: 2022-05-04

## 2022-06-27 RX ORDER — LORATADINE 10 MG/1
CAPSULE, LIQUID FILLED ORAL
Refills: 0 | Status: ACTIVE | COMMUNITY

## 2022-06-27 RX ORDER — PANTOPRAZOLE 40 MG/1
40 TABLET, DELAYED RELEASE ORAL
Qty: 30 | Refills: 0 | Status: ACTIVE | COMMUNITY
Start: 2022-06-25

## 2022-06-27 RX ORDER — MONTELUKAST 10 MG/1
10 TABLET, FILM COATED ORAL
Qty: 30 | Refills: 0 | Status: ACTIVE | COMMUNITY
Start: 2022-06-01

## 2022-06-27 RX ORDER — ISONIAZID 300 MG/1
300 TABLET ORAL
Qty: 30 | Refills: 0 | Status: ACTIVE | COMMUNITY
Start: 2022-05-04

## 2022-06-27 RX ORDER — LIDOCAINE 40 MG/G
4 PATCH TOPICAL
Qty: 30 | Refills: 0 | Status: ACTIVE | COMMUNITY
Start: 2022-04-20

## 2022-06-27 RX ORDER — POLYETHYLENE GLYCOL 3350 17 G/17G
17 POWDER, FOR SOLUTION ORAL
Qty: 238 | Refills: 0 | Status: ACTIVE | COMMUNITY
Start: 2022-05-26

## 2022-06-27 RX ORDER — FLUTICASONE PROPIONATE 50 MCG
SPRAY, SUSPENSION NASAL
Refills: 0 | Status: ACTIVE | COMMUNITY

## 2022-06-27 RX ORDER — GABAPENTIN 300 MG/1
300 CAPSULE ORAL
Qty: 30 | Refills: 0 | Status: ACTIVE | COMMUNITY
Start: 2022-05-04

## 2022-06-27 RX ORDER — HYDROCORTISONE 2.5% 25 MG/G
2.5 CREAM TOPICAL
Qty: 30 | Refills: 0 | Status: ACTIVE | COMMUNITY
Start: 2022-05-04

## 2022-06-27 RX ORDER — MELOXICAM 15 MG/1
15 TABLET ORAL
Qty: 90 | Refills: 0 | Status: ACTIVE | COMMUNITY
Start: 2022-02-03

## 2022-06-27 NOTE — PHYSICAL EXAM
[Calm] : calm [de-identified] : She  is alert, well-groomed, and in NAD\par   [de-identified] : anicteric.  Nasal mucosa pink, septum midline. Oral mucosa pink.  Tongue midline, Pharynx without exudates.\par   [de-identified] : Neck supple. Trachea midline. Thyroid isthmus barely palpable, lobes not felt.\par   [de-identified] : Surgical wounds  healed well.   no signs of  inflammation or infection.

## 2022-06-27 NOTE — PLAN
[FreeTextEntry1] : Ms. ORTIZ  was told significance of findings, options, risks and benefits were explained.   All surgical options were discussed including non-surgical treatments.   case was discussed with Dr Barriga (GI) patient will schedule a consultation with him for EUS and biopsy of the polyp. \par Patient advised to seek immediate medical attention with any acute change in symptoms or with the development of any new or worsening symptoms.  Patient's questions and concerns addressed to patient's satisfaction, and patient verbalized an understanding of the information discussed.\par \par

## 2022-07-08 ENCOUNTER — APPOINTMENT (OUTPATIENT)
Dept: GASTROENTEROLOGY | Facility: CLINIC | Age: 47
End: 2022-07-08

## 2022-07-08 VITALS
TEMPERATURE: 97.5 F | WEIGHT: 167 LBS | HEART RATE: 64 BPM | OXYGEN SATURATION: 99 % | SYSTOLIC BLOOD PRESSURE: 140 MMHG | DIASTOLIC BLOOD PRESSURE: 88 MMHG | BODY MASS INDEX: 29.59 KG/M2 | HEIGHT: 63 IN

## 2022-07-08 DIAGNOSIS — R10.9 UNSPECIFIED ABDOMINAL PAIN: ICD-10-CM

## 2022-07-08 DIAGNOSIS — K31.9 DISEASE OF STOMACH AND DUODENUM, UNSPECIFIED: ICD-10-CM

## 2022-07-08 PROCEDURE — 99204 OFFICE O/P NEW MOD 45 MIN: CPT

## 2022-07-08 NOTE — ASSESSMENT
[FreeTextEntry1] : 46F with pmhx of asthma, GERD, OA presenting for evaluation of a gastric polypoid lesion. Referred by Dr. Matias. Had EGD for epigastric pain with outside GI showing a 2.5cm gastric polypoid lesion, biopsies unremarkable but EGD images reviewed, appears subepithelial. Referred for EUS, biopsy. States still intermittent left lower quadrant and epigastirc discomfort on occasion, stable. \par - Schedule EUS, possible FNB to evaluate lesion.\par - COVID testing prior.

## 2022-07-08 NOTE — HISTORY OF PRESENT ILLNESS
[de-identified] : 46F with pmhx of asthma, GERD, OA presenting for evaluation of a gastric polypoid lesion. Referred by Dr. Matias. Had EGD for epigastric pain with outside GI showing a 2.5cm gastric polypoid lesion, biopsies unremarkable but EGD images reviewed, appears subepithelial. Referred for EUS, biopsy. States still intermittent left lower quadrant and epigastirc discomfort on occasion, stable. Denies any other complaints today, no n/v/d/c, melena, hematochezia, fever/chills, or other issues.

## 2022-07-19 LAB — SARS-COV-2 N GENE NPH QL NAA+PROBE: NOT DETECTED

## 2022-07-21 ENCOUNTER — RESULT REVIEW (OUTPATIENT)
Age: 47
End: 2022-07-21

## 2022-07-21 ENCOUNTER — APPOINTMENT (OUTPATIENT)
Dept: GASTROENTEROLOGY | Facility: HOSPITAL | Age: 47
End: 2022-07-21

## 2022-07-21 ENCOUNTER — TRANSCRIPTION ENCOUNTER (OUTPATIENT)
Age: 47
End: 2022-07-21

## 2022-07-21 ENCOUNTER — OUTPATIENT (OUTPATIENT)
Dept: OUTPATIENT SERVICES | Facility: HOSPITAL | Age: 47
LOS: 1 days | End: 2022-07-21
Payer: COMMERCIAL

## 2022-07-21 VITALS
HEART RATE: 78 BPM | RESPIRATION RATE: 16 BRPM | OXYGEN SATURATION: 99 % | SYSTOLIC BLOOD PRESSURE: 122 MMHG | DIASTOLIC BLOOD PRESSURE: 68 MMHG

## 2022-07-21 VITALS
OXYGEN SATURATION: 99 % | HEIGHT: 63 IN | RESPIRATION RATE: 14 BRPM | WEIGHT: 171.96 LBS | DIASTOLIC BLOOD PRESSURE: 76 MMHG | HEART RATE: 65 BPM | SYSTOLIC BLOOD PRESSURE: 135 MMHG | TEMPERATURE: 98 F

## 2022-07-21 DIAGNOSIS — R10.9 UNSPECIFIED ABDOMINAL PAIN: ICD-10-CM

## 2022-07-21 DIAGNOSIS — K31.9 DISEASE OF STOMACH AND DUODENUM, UNSPECIFIED: ICD-10-CM

## 2022-07-21 DIAGNOSIS — Z98.891 HISTORY OF UTERINE SCAR FROM PREVIOUS SURGERY: Chronic | ICD-10-CM

## 2022-07-21 DIAGNOSIS — Z90.49 ACQUIRED ABSENCE OF OTHER SPECIFIED PARTS OF DIGESTIVE TRACT: Chronic | ICD-10-CM

## 2022-07-21 DIAGNOSIS — Z98.890 OTHER SPECIFIED POSTPROCEDURAL STATES: Chronic | ICD-10-CM

## 2022-07-21 LAB — HCG UR QL: NEGATIVE — SIGNIFICANT CHANGE UP

## 2022-07-21 PROCEDURE — 43242 EGD US FINE NEEDLE BX/ASPIR: CPT

## 2022-07-21 PROCEDURE — 81025 URINE PREGNANCY TEST: CPT

## 2022-07-21 PROCEDURE — 88312 SPECIAL STAINS GROUP 1: CPT

## 2022-07-21 PROCEDURE — 43236 UPPR GI SCOPE W/SUBMUC INJ: CPT | Mod: XS

## 2022-07-21 PROCEDURE — 88312 SPECIAL STAINS GROUP 1: CPT | Mod: 26

## 2022-07-21 PROCEDURE — 88305 TISSUE EXAM BY PATHOLOGIST: CPT | Mod: 26

## 2022-07-21 PROCEDURE — 88305 TISSUE EXAM BY PATHOLOGIST: CPT

## 2022-07-21 DEVICE — CATH BALLOON DIL 6-8MM: Type: IMPLANTABLE DEVICE | Status: FUNCTIONAL

## 2022-07-21 DEVICE — CATH ESOPH DIL 9 ATM 6FR 8-10MM: Type: IMPLANTABLE DEVICE | Status: FUNCTIONAL

## 2022-07-21 DEVICE — CATH ESOPH DIL 8 ATM 6FR10-12M: Type: IMPLANTABLE DEVICE | Status: FUNCTIONAL

## 2022-07-21 RX ORDER — SODIUM CHLORIDE 9 MG/ML
500 INJECTION INTRAMUSCULAR; INTRAVENOUS; SUBCUTANEOUS
Refills: 0 | Status: DISCONTINUED | OUTPATIENT
Start: 2022-07-21 | End: 2022-08-04

## 2022-07-21 RX ORDER — ACETAMINOPHEN 500 MG
1000 TABLET ORAL ONCE
Refills: 0 | Status: COMPLETED | OUTPATIENT
Start: 2022-07-21 | End: 2022-07-21

## 2022-07-21 RX ADMIN — Medication 400 MILLIGRAM(S): at 10:05

## 2022-07-21 NOTE — ASU PATIENT PROFILE, ADULT - NSICDXPASTMEDICALHX_GEN_ALL_CORE_FT
PAST MEDICAL HISTORY:  H/O gastritis     Hyperlipidemia     Seasonal allergies      PAST MEDICAL HISTORY:  Arthritis     H/O gastritis     Hyperlipidemia     Seasonal allergies

## 2022-07-21 NOTE — ASU PATIENT PROFILE, ADULT - NSICDXPASTSURGICALHX_GEN_ALL_CORE_FT
PAST SURGICAL HISTORY:  H/O  section x 5    History of cholecystectomy     S/P cervical polypectomy

## 2022-07-21 NOTE — ASU PATIENT PROFILE, ADULT - FALL HARM RISK - UNIVERSAL INTERVENTIONS
Bed in lowest position, wheels locked, appropriate side rails in place/Call bell, personal items and telephone in reach/Instruct patient to call for assistance before getting out of bed or chair/Non-slip footwear when patient is out of bed/[place] to call system/Physically safe environment - no spills, clutter or unnecessary equipment/Purposeful Proactive Rounding/Room/bathroom lighting operational, light cord in reach

## 2022-07-28 LAB — SURGICAL PATHOLOGY STUDY: SIGNIFICANT CHANGE UP

## 2022-08-01 PROBLEM — M19.90 UNSPECIFIED OSTEOARTHRITIS, UNSPECIFIED SITE: Chronic | Status: ACTIVE | Noted: 2022-07-21

## 2022-08-04 ENCOUNTER — APPOINTMENT (OUTPATIENT)
Dept: SURGERY | Facility: CLINIC | Age: 47
End: 2022-08-04

## 2022-08-04 VITALS
WEIGHT: 169 LBS | BODY MASS INDEX: 29.95 KG/M2 | SYSTOLIC BLOOD PRESSURE: 144 MMHG | HEIGHT: 63 IN | HEART RATE: 71 BPM | DIASTOLIC BLOOD PRESSURE: 102 MMHG

## 2022-08-04 VITALS — TEMPERATURE: 97.2 F

## 2022-08-04 PROCEDURE — 99213 OFFICE O/P EST LOW 20 MIN: CPT

## 2022-08-04 RX ORDER — DICLOFENAC SODIUM 1% 10 MG/G
1 GEL TOPICAL
Qty: 100 | Refills: 0 | Status: COMPLETED | COMMUNITY
Start: 2022-04-20 | End: 2022-08-04

## 2022-08-04 RX ORDER — DICLOFENAC SODIUM 50 MG/1
50 TABLET, DELAYED RELEASE ORAL
Qty: 30 | Refills: 0 | Status: COMPLETED | COMMUNITY
Start: 2022-06-25 | End: 2022-08-04

## 2022-08-04 RX ORDER — DICLOFENAC SODIUM 100 MG/1
TABLET, FILM COATED, EXTENDED RELEASE ORAL
Refills: 0 | Status: COMPLETED | COMMUNITY
End: 2022-08-04

## 2022-08-04 RX ORDER — FAMOTIDINE 10 MG/1
TABLET, FILM COATED ORAL
Refills: 0 | Status: COMPLETED | COMMUNITY
End: 2022-08-04

## 2022-08-04 RX ORDER — DICYCLOMINE HYDROCHLORIDE 10 MG/1
CAPSULE ORAL
Refills: 0 | Status: COMPLETED | COMMUNITY
End: 2022-08-04

## 2022-08-04 RX ORDER — DICYCLOMINE HYDROCHLORIDE 10 MG/1
10 CAPSULE ORAL
Qty: 60 | Refills: 0 | Status: COMPLETED | COMMUNITY
Start: 2022-06-25 | End: 2022-08-04

## 2022-08-04 NOTE — DATA REVIEWED
[FreeTextEntry1] : Komal Accession Number : [id number]\par Patient:   RADHA ORTIZ\par \par \par Accession:                             [id number]\par \par Collected Date/Time:                   7/21/2022 09:30 EDT\par Received Date/Time:                    7/22/2022 15:29 EDT\par \par Surgical Pathology Report - Auth (Verified)\par \par Specimen(s) Submitted\par 1-Gastric subepithelial mass\par \par Final Diagnosis\par Gastric subepithelial mass, EUS biopsy: Fragments of cellular spindle cell\par tumor, consistent with gastrointestinal stromal tumor (GIST)\par Scant benign fragments of superficial gastric epithelium\par See comment\par \par Verified by: Samantha Lazo M.D.\par (Electronic Signature)\par Reported on: 07/28/22 12:43 EDT, [facility], [address]\par [address] [place], [place] [id number]

## 2022-08-04 NOTE — HISTORY OF PRESENT ILLNESS
[de-identified] : This is a 46 year old patient who was referred by Dr. Eric Crawford with the chief complaint of having stomach mass. She had an EGD on 05/31/2022 that showed 2.5 cm sessile polyp. Biopsy of the polyp was negative for dysplasia.  Ms. ORTIZ  is s/p EUS on 07/21/2022  by Dr Barriga that showed One 3.1 cm gastric subepithelial mass, suspicious for a GIST. EUS-FNB performed. Tattooed. Patient's pathology results were  consistent with Fragments of cellular spindle cell tumor, consistent with gastrointestinal stromal tumor (GIST). She denies any fever or night sweats. Appetite is good and weight is stable. she is here to discuss her surgery\par

## 2022-08-04 NOTE — PLAN
[FreeTextEntry1] : Ms. ORTIZ  was told significance of findings, options, risks and benefits were explained.  Informed consent for laparoscopic assisted partial gastrectomy and potential risks, benefits and alternatives (surgical options were discussed including non-surgical options or the option of no surgery) to the planned surgery were discussed in depth.  All surgical options were discussed including non-surgical treatments.  She wishes to proceed with surgery.  We will plan for surgery on at the next available date, pending any required insurance pre-certification or pre-approval. She agrees to obtain any necessary pre-operative evaluations and testing prior to surgery.\par Patient advised to seek immediate medical attention with any acute change in symptoms or with the development of any new or worsening symptoms.  Patient's questions and concerns addressed to patient's satisfaction, and patient verbalized an understanding of the information discussed.\par \par

## 2022-08-12 ENCOUNTER — OUTPATIENT (OUTPATIENT)
Dept: OUTPATIENT SERVICES | Facility: HOSPITAL | Age: 47
LOS: 1 days | End: 2022-08-12
Payer: COMMERCIAL

## 2022-08-12 VITALS
WEIGHT: 169.09 LBS | OXYGEN SATURATION: 98 % | HEART RATE: 72 BPM | SYSTOLIC BLOOD PRESSURE: 128 MMHG | HEIGHT: 64 IN | DIASTOLIC BLOOD PRESSURE: 78 MMHG | RESPIRATION RATE: 16 BRPM | TEMPERATURE: 98 F

## 2022-08-12 DIAGNOSIS — Z87.19 PERSONAL HISTORY OF OTHER DISEASES OF THE DIGESTIVE SYSTEM: ICD-10-CM

## 2022-08-12 DIAGNOSIS — K31.7 POLYP OF STOMACH AND DUODENUM: ICD-10-CM

## 2022-08-12 DIAGNOSIS — Z98.891 HISTORY OF UTERINE SCAR FROM PREVIOUS SURGERY: Chronic | ICD-10-CM

## 2022-08-12 DIAGNOSIS — D21.9 BENIGN NEOPLASM OF CONNECTIVE AND OTHER SOFT TISSUE, UNSPECIFIED: ICD-10-CM

## 2022-08-12 DIAGNOSIS — Z90.49 ACQUIRED ABSENCE OF OTHER SPECIFIED PARTS OF DIGESTIVE TRACT: Chronic | ICD-10-CM

## 2022-08-12 DIAGNOSIS — Z98.890 OTHER SPECIFIED POSTPROCEDURAL STATES: Chronic | ICD-10-CM

## 2022-08-12 DIAGNOSIS — Z01.818 ENCOUNTER FOR OTHER PREPROCEDURAL EXAMINATION: ICD-10-CM

## 2022-08-12 DIAGNOSIS — M19.90 UNSPECIFIED OSTEOARTHRITIS, UNSPECIFIED SITE: ICD-10-CM

## 2022-08-12 DIAGNOSIS — Z98.51 TUBAL LIGATION STATUS: Chronic | ICD-10-CM

## 2022-08-12 DIAGNOSIS — D21.4 BENIGN NEOPLASM OF CONNECTIVE AND OTHER SOFT TISSUE OF ABDOMEN: ICD-10-CM

## 2022-08-12 LAB — BLD GP AB SCN SERPL QL: SIGNIFICANT CHANGE UP

## 2022-08-12 PROCEDURE — G0463: CPT

## 2022-08-12 RX ORDER — ERGOCALCIFEROL 1.25 MG/1
1 CAPSULE ORAL
Qty: 0 | Refills: 0 | DISCHARGE

## 2022-08-12 NOTE — H&P PST ADULT - NSICDXPASTMEDICALHX_GEN_ALL_CORE_FT
PAST MEDICAL HISTORY:  H/O gastritis     Hyperlipidemia     Seasonal allergies      PAST MEDICAL HISTORY:  Arthritis     Diverticulosis     H/O gastritis     Seasonal allergies

## 2022-08-12 NOTE — H&P PST ADULT - NSICDXPASTSURGICALHX_GEN_ALL_CORE_FT
I have personally seen and evaluated the device findings. Interrogation reviewed and I agree with assessment. Airam Mitchell PAST SURGICAL HISTORY:  H/O  section x 5    S/P cervical polypectomy      PAST SURGICAL HISTORY:  H/O  section x 5    H/O tubal ligation     History of cholecystectomy     S/P cervical polypectomy

## 2022-08-12 NOTE — H&P PST ADULT - HISTORY OF PRESENT ILLNESS
47 yo female with history of gastrtis,  Seasonal Allergies, Arthritis, Gastritis, reports the above.  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

## 2022-08-12 NOTE — H&P PST ADULT - PROBLEM SELECTOR PLAN 1
scheduled for Laparoscopic partial gastrectomy on 8/31/2022.      Preoperative instructions discussed and given to patient.  Instructed patient to make appointment for COVID 19 test 72 hrs prior to surgery.   NPO after midnight the day before surgery.   Instructed to avoid aspirin and over the counter medications including vitamins and herbal medications one week before surgery.   Discussed use of chlorhexidine solution 4% and or dial soap the morning of surgery for pre-procedural skin preparation.   Verbal and written instructions were given to patient.  Patient verbalized understanding and is in agreement with plan of care

## 2022-08-12 NOTE — H&P PST ADULT - I HAVE PERSONALLY SEEN AND EXAMINED THE PATIENT. THERE HAVE NOT BEEN ANY CHANGES IN THE PATIENT'S HISTORY OR EXAM UNLESS COMMENTED BELOW
Spoke with pt's daughter   Updated Metoprolol tart from 25 mg 12 5 daily  Pt is only taking 12 5 daily Statement Selected

## 2022-08-12 NOTE — H&P PST ADULT - PROBLEM/PLAN-1
Received medical records from pt during ov on 2/25/22.     Reviewed by Dr. Lanita Mcardle to scanning
DISPLAY PLAN FREE TEXT

## 2022-08-12 NOTE — H&P PST ADULT - GASTROINTESTINAL
Called by RN to evaluate pt with , /79 (Auto) and 168/74 (manual), and confusion.   Pt seen and examined at bedside. Pt able to answer questions appropriately, but attempting to climb OOB,  and asking to go to bathroom. Twelve Lead EKG done with SVT . Called and d/w Attending Dr Styles  who recommended Metoprolol 10 mg IV Q6h, CXR, ABG stat, and Vanco 1 gm Q12h (possibe Asp PNA).  MICU to be called if pt decompensates - Pt not DNR.   Endorsed to Night NP for further care. details… normal/soft/nontender/nondistended/normal active bowel sounds

## 2022-08-12 NOTE — H&P PST ADULT - ASSESSMENT
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    sop bang 1

## 2022-08-30 ENCOUNTER — TRANSCRIPTION ENCOUNTER (OUTPATIENT)
Age: 47
End: 2022-08-30

## 2022-08-31 ENCOUNTER — INPATIENT (INPATIENT)
Facility: HOSPITAL | Age: 47
LOS: 1 days | Discharge: HOME | DRG: 989 | End: 2022-09-02
Attending: SPECIALIST | Admitting: SPECIALIST
Payer: COMMERCIAL

## 2022-08-31 ENCOUNTER — APPOINTMENT (OUTPATIENT)
Dept: SURGERY | Facility: HOSPITAL | Age: 47
End: 2022-08-31
Payer: COMMERCIAL

## 2022-08-31 ENCOUNTER — TRANSCRIPTION ENCOUNTER (OUTPATIENT)
Age: 47
End: 2022-08-31

## 2022-08-31 ENCOUNTER — RESULT REVIEW (OUTPATIENT)
Age: 47
End: 2022-08-31

## 2022-08-31 VITALS
DIASTOLIC BLOOD PRESSURE: 78 MMHG | HEIGHT: 64 IN | OXYGEN SATURATION: 98 % | TEMPERATURE: 98 F | SYSTOLIC BLOOD PRESSURE: 121 MMHG | WEIGHT: 169.09 LBS | HEART RATE: 75 BPM | RESPIRATION RATE: 17 BRPM

## 2022-08-31 DIAGNOSIS — D21.4 BENIGN NEOPLASM OF CONNECTIVE AND OTHER SOFT TISSUE OF ABDOMEN: ICD-10-CM

## 2022-08-31 DIAGNOSIS — Z98.890 OTHER SPECIFIED POSTPROCEDURAL STATES: Chronic | ICD-10-CM

## 2022-08-31 DIAGNOSIS — Z98.891 HISTORY OF UTERINE SCAR FROM PREVIOUS SURGERY: Chronic | ICD-10-CM

## 2022-08-31 DIAGNOSIS — Z98.51 TUBAL LIGATION STATUS: Chronic | ICD-10-CM

## 2022-08-31 DIAGNOSIS — K31.89 OTHER DISEASES OF STOMACH AND DUODENUM: ICD-10-CM

## 2022-08-31 DIAGNOSIS — Z90.49 ACQUIRED ABSENCE OF OTHER SPECIFIED PARTS OF DIGESTIVE TRACT: Chronic | ICD-10-CM

## 2022-08-31 LAB
A1C WITH ESTIMATED AVERAGE GLUCOSE RESULT: 5.7 % — HIGH (ref 4–5.6)
BLD GP AB SCN SERPL QL: SIGNIFICANT CHANGE UP
ESTIMATED AVERAGE GLUCOSE: 117 MG/DL — HIGH (ref 68–114)
GLUCOSE BLDC GLUCOMTR-MCNC: 100 MG/DL — HIGH (ref 70–99)
HCG UR QL: NEGATIVE — SIGNIFICANT CHANGE UP

## 2022-08-31 PROCEDURE — XXXXX: CPT | Mod: 1L

## 2022-08-31 PROCEDURE — 88309 TISSUE EXAM BY PATHOLOGIST: CPT | Mod: 26

## 2022-08-31 DEVICE — STAPLER COVIDIEN TA 60 BLUE: Type: IMPLANTABLE DEVICE | Status: FUNCTIONAL

## 2022-08-31 DEVICE — STAPLER COVIDIEN TA 30 BLUE: Type: IMPLANTABLE DEVICE | Status: FUNCTIONAL

## 2022-08-31 DEVICE — CLIP APPLIER COVIDIEN SURGICLIP 13" LARGE: Type: IMPLANTABLE DEVICE | Status: FUNCTIONAL

## 2022-08-31 DEVICE — STAPLER ECHELON CONTOUR CURVED CUTTER 40MM WITH (GREEN) RELOAD: Type: IMPLANTABLE DEVICE | Status: FUNCTIONAL

## 2022-08-31 DEVICE — STAPLER COVIDIEN TRI-STAPLE 60MM PURPLE RELOAD: Type: IMPLANTABLE DEVICE | Status: FUNCTIONAL

## 2022-08-31 DEVICE — STAPLER COVIDIEN TA 90 GREEN: Type: IMPLANTABLE DEVICE | Status: FUNCTIONAL

## 2022-08-31 DEVICE — STAPLER CONTOUR CURVED WITH BLUE CART: Type: IMPLANTABLE DEVICE | Status: FUNCTIONAL

## 2022-08-31 DEVICE — CLIP APPLIER COVIDIEN ENDOCLIP II 10MM MED/LG: Type: IMPLANTABLE DEVICE | Status: FUNCTIONAL

## 2022-08-31 RX ORDER — ESCITALOPRAM OXALATE 10 MG/1
2 TABLET, FILM COATED ORAL
Qty: 0 | Refills: 0 | DISCHARGE

## 2022-08-31 RX ORDER — ONDANSETRON 8 MG/1
4 TABLET, FILM COATED ORAL EVERY 6 HOURS
Refills: 0 | Status: DISCONTINUED | OUTPATIENT
Start: 2022-08-31 | End: 2022-09-02

## 2022-08-31 RX ORDER — ESCITALOPRAM OXALATE 10 MG/1
5 TABLET, FILM COATED ORAL DAILY
Refills: 0 | Status: DISCONTINUED | OUTPATIENT
Start: 2022-08-31 | End: 2022-09-02

## 2022-08-31 RX ORDER — HEPARIN SODIUM 5000 [USP'U]/ML
5000 INJECTION INTRAVENOUS; SUBCUTANEOUS EVERY 8 HOURS
Refills: 0 | Status: DISCONTINUED | OUTPATIENT
Start: 2022-08-31 | End: 2022-09-02

## 2022-08-31 RX ORDER — SODIUM CHLORIDE 9 MG/ML
3 INJECTION INTRAMUSCULAR; INTRAVENOUS; SUBCUTANEOUS EVERY 8 HOURS
Refills: 0 | Status: DISCONTINUED | OUTPATIENT
Start: 2022-08-31 | End: 2022-08-31

## 2022-08-31 RX ORDER — ONDANSETRON 8 MG/1
4 TABLET, FILM COATED ORAL ONCE
Refills: 0 | Status: DISCONTINUED | OUTPATIENT
Start: 2022-08-31 | End: 2022-08-31

## 2022-08-31 RX ORDER — ACETAMINOPHEN 500 MG
650 TABLET ORAL EVERY 6 HOURS
Refills: 0 | Status: DISCONTINUED | OUTPATIENT
Start: 2022-08-31 | End: 2022-08-31

## 2022-08-31 RX ORDER — FAMOTIDINE 10 MG/ML
1 INJECTION INTRAVENOUS
Qty: 0 | Refills: 0 | DISCHARGE

## 2022-08-31 RX ORDER — FENTANYL CITRATE 50 UG/ML
50 INJECTION INTRAVENOUS
Refills: 0 | Status: DISCONTINUED | OUTPATIENT
Start: 2022-08-31 | End: 2022-08-31

## 2022-08-31 RX ORDER — FENTANYL CITRATE 50 UG/ML
25 INJECTION INTRAVENOUS
Refills: 0 | Status: DISCONTINUED | OUTPATIENT
Start: 2022-08-31 | End: 2022-08-31

## 2022-08-31 RX ORDER — ACETAMINOPHEN 500 MG
1000 TABLET ORAL ONCE
Refills: 0 | Status: COMPLETED | OUTPATIENT
Start: 2022-08-31 | End: 2022-08-31

## 2022-08-31 RX ORDER — ALVIMOPAN 12 MG/1
12 CAPSULE ORAL ONCE
Refills: 0 | Status: COMPLETED | OUTPATIENT
Start: 2022-08-31 | End: 2022-08-31

## 2022-08-31 RX ORDER — FLUTICASONE PROPIONATE 220 MCG
1 AEROSOL WITH ADAPTER (GRAM) INHALATION
Qty: 0 | Refills: 0 | DISCHARGE

## 2022-08-31 RX ORDER — DEXTROSE MONOHYDRATE, SODIUM CHLORIDE, AND POTASSIUM CHLORIDE 50; .745; 4.5 G/1000ML; G/1000ML; G/1000ML
1000 INJECTION, SOLUTION INTRAVENOUS
Refills: 0 | Status: DISCONTINUED | OUTPATIENT
Start: 2022-08-31 | End: 2022-09-02

## 2022-08-31 RX ORDER — PANTOPRAZOLE SODIUM 20 MG/1
40 TABLET, DELAYED RELEASE ORAL
Refills: 0 | Status: DISCONTINUED | OUTPATIENT
Start: 2022-08-31 | End: 2022-09-02

## 2022-08-31 RX ORDER — MONTELUKAST 4 MG/1
1 TABLET, CHEWABLE ORAL
Qty: 0 | Refills: 0 | DISCHARGE

## 2022-08-31 RX ORDER — CHLORHEXIDINE GLUCONATE 213 G/1000ML
1 SOLUTION TOPICAL ONCE
Refills: 0 | Status: COMPLETED | OUTPATIENT
Start: 2022-08-31 | End: 2022-08-31

## 2022-08-31 RX ADMIN — Medication 75 MILLIGRAM(S): at 17:52

## 2022-08-31 RX ADMIN — CHLORHEXIDINE GLUCONATE 1 APPLICATION(S): 213 SOLUTION TOPICAL at 08:23

## 2022-08-31 RX ADMIN — Medication 400 MILLIGRAM(S): at 17:52

## 2022-08-31 RX ADMIN — HEPARIN SODIUM 5000 UNIT(S): 5000 INJECTION INTRAVENOUS; SUBCUTANEOUS at 22:07

## 2022-08-31 RX ADMIN — Medication 1000 MILLIGRAM(S): at 18:20

## 2022-08-31 RX ADMIN — FENTANYL CITRATE 25 MICROGRAM(S): 50 INJECTION INTRAVENOUS at 12:55

## 2022-08-31 RX ADMIN — FENTANYL CITRATE 25 MICROGRAM(S): 50 INJECTION INTRAVENOUS at 13:30

## 2022-08-31 RX ADMIN — FENTANYL CITRATE 25 MICROGRAM(S): 50 INJECTION INTRAVENOUS at 14:03

## 2022-08-31 RX ADMIN — ALVIMOPAN 12 MILLIGRAM(S): 12 CAPSULE ORAL at 08:27

## 2022-08-31 RX ADMIN — FENTANYL CITRATE 25 MICROGRAM(S): 50 INJECTION INTRAVENOUS at 14:33

## 2022-08-31 NOTE — PATIENT PROFILE ADULT - MONEY FOR FOOD
Per Dr. Davis no

## 2022-09-01 DIAGNOSIS — Z22.7 LATENT TUBERCULOSIS: ICD-10-CM

## 2022-09-01 LAB
ANION GAP SERPL CALC-SCNC: 8 MMOL/L — SIGNIFICANT CHANGE UP (ref 5–17)
BASOPHILS # BLD AUTO: 0.02 K/UL — SIGNIFICANT CHANGE UP (ref 0–0.2)
BASOPHILS NFR BLD AUTO: 0.2 % — SIGNIFICANT CHANGE UP (ref 0–2)
BUN SERPL-MCNC: 9 MG/DL — SIGNIFICANT CHANGE UP (ref 7–18)
CALCIUM SERPL-MCNC: 8 MG/DL — LOW (ref 8.4–10.5)
CHLORIDE SERPL-SCNC: 108 MMOL/L — SIGNIFICANT CHANGE UP (ref 96–108)
CO2 SERPL-SCNC: 24 MMOL/L — SIGNIFICANT CHANGE UP (ref 22–31)
CREAT SERPL-MCNC: 0.62 MG/DL — SIGNIFICANT CHANGE UP (ref 0.5–1.3)
EGFR: 110 ML/MIN/1.73M2 — SIGNIFICANT CHANGE UP
EOSINOPHIL # BLD AUTO: 0.01 K/UL — SIGNIFICANT CHANGE UP (ref 0–0.5)
EOSINOPHIL NFR BLD AUTO: 0.1 % — SIGNIFICANT CHANGE UP (ref 0–6)
GLUCOSE SERPL-MCNC: 123 MG/DL — HIGH (ref 70–99)
HCT VFR BLD CALC: 35.4 % — SIGNIFICANT CHANGE UP (ref 34.5–45)
HGB BLD-MCNC: 11.7 G/DL — SIGNIFICANT CHANGE UP (ref 11.5–15.5)
IMM GRANULOCYTES NFR BLD AUTO: 0.4 % — SIGNIFICANT CHANGE UP (ref 0–1.5)
LYMPHOCYTES # BLD AUTO: 18 % — SIGNIFICANT CHANGE UP (ref 13–44)
LYMPHOCYTES # BLD AUTO: 2.17 K/UL — SIGNIFICANT CHANGE UP (ref 1–3.3)
MCHC RBC-ENTMCNC: 31.1 PG — SIGNIFICANT CHANGE UP (ref 27–34)
MCHC RBC-ENTMCNC: 33.1 GM/DL — SIGNIFICANT CHANGE UP (ref 32–36)
MCV RBC AUTO: 94.1 FL — SIGNIFICANT CHANGE UP (ref 80–100)
MONOCYTES # BLD AUTO: 0.88 K/UL — SIGNIFICANT CHANGE UP (ref 0–0.9)
MONOCYTES NFR BLD AUTO: 7.3 % — SIGNIFICANT CHANGE UP (ref 2–14)
NEUTROPHILS # BLD AUTO: 8.95 K/UL — HIGH (ref 1.8–7.4)
NEUTROPHILS NFR BLD AUTO: 74 % — SIGNIFICANT CHANGE UP (ref 43–77)
NRBC # BLD: 0 /100 WBCS — SIGNIFICANT CHANGE UP (ref 0–0)
PLATELET # BLD AUTO: 220 K/UL — SIGNIFICANT CHANGE UP (ref 150–400)
POTASSIUM SERPL-MCNC: 3.9 MMOL/L — SIGNIFICANT CHANGE UP (ref 3.5–5.3)
POTASSIUM SERPL-SCNC: 3.9 MMOL/L — SIGNIFICANT CHANGE UP (ref 3.5–5.3)
RBC # BLD: 3.76 M/UL — LOW (ref 3.8–5.2)
RBC # FLD: 13.2 % — SIGNIFICANT CHANGE UP (ref 10.3–14.5)
SODIUM SERPL-SCNC: 140 MMOL/L — SIGNIFICANT CHANGE UP (ref 135–145)
WBC # BLD: 12.08 K/UL — HIGH (ref 3.8–10.5)
WBC # FLD AUTO: 12.08 K/UL — HIGH (ref 3.8–10.5)

## 2022-09-01 RX ADMIN — ESCITALOPRAM OXALATE 5 MILLIGRAM(S): 10 TABLET, FILM COATED ORAL at 11:21

## 2022-09-01 RX ADMIN — HEPARIN SODIUM 5000 UNIT(S): 5000 INJECTION INTRAVENOUS; SUBCUTANEOUS at 05:11

## 2022-09-01 RX ADMIN — HEPARIN SODIUM 5000 UNIT(S): 5000 INJECTION INTRAVENOUS; SUBCUTANEOUS at 13:17

## 2022-09-01 RX ADMIN — Medication 75 MILLIGRAM(S): at 05:12

## 2022-09-01 RX ADMIN — HEPARIN SODIUM 5000 UNIT(S): 5000 INJECTION INTRAVENOUS; SUBCUTANEOUS at 21:50

## 2022-09-01 RX ADMIN — Medication 75 MILLIGRAM(S): at 17:01

## 2022-09-01 RX ADMIN — PANTOPRAZOLE SODIUM 40 MILLIGRAM(S): 20 TABLET, DELAYED RELEASE ORAL at 05:12

## 2022-09-01 RX ADMIN — DEXTROSE MONOHYDRATE, SODIUM CHLORIDE, AND POTASSIUM CHLORIDE 125 MILLILITER(S): 50; .745; 4.5 INJECTION, SOLUTION INTRAVENOUS at 22:02

## 2022-09-01 NOTE — CONSULT NOTE ADULT - PROBLEM SELECTOR RECOMMENDATION 9
S/p lap partial gastrectomy  pain control  incentive spirometry  DVT PPX  early ambulation  Diet as per surgery  surgical follow up  check path

## 2022-09-01 NOTE — CONSULT NOTE ADULT - SUBJECTIVE AND OBJECTIVE BOX
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 Awake, alert, comfortable in bed in NAD.        INTERVAL HPI/OVERNIGHT EVENTS:  T(C): 36.8 (22 @ 05:25), Max: 37.5 (22 @ 21:29)  HR: 85 (22 @ 05:25) (85 - 104)  BP: 123/77 (22 @ 05:25) (112/92 - 144/88)  RR: 18 (22 @ 05:25) (16 - 24)  SpO2: 99% (22 @ 05:25) (97% - 100%)  Wt(kg): --  I&O's Summary    31 Aug 2022 07:01  -  01 Sep 2022 07:00  --------------------------------------------------------  IN: 0 mL / OUT: 400 mL / NET: -400 mL        PAST MEDICAL & SURGICAL HISTORY:  H/O gastritis      Seasonal allergies      Arthritis      Diverticulosis      H/O  section  x 5      S/P cervical polypectomy      History of cholecystectomy      H/O tubal ligation          SOCIAL HISTORY  Alcohol:  Tobacco:  Illicit substance use:      FAMILY HISTORY:      LABS:                        11.7   12.08 )-----------( 220      ( 01 Sep 2022 05:35 )             35.4         140  |  108  |  9   ----------------------------<  123<H>  3.9   |  24  |  0.62    Ca    8.0<L>      01 Sep 2022 05:35          CAPILLARY BLOOD GLUCOSE                MEDICATIONS  (STANDING):  dextrose 5% + sodium chloride 0.9% with potassium chloride 20 mEq/L 1000 milliLiter(s) (125 mL/Hr) IV Continuous <Continuous>  escitalopram 5 milliGRAM(s) Oral daily  heparin   Injectable 5000 Unit(s) SubCutaneous every 8 hours  pantoprazole    Tablet 40 milliGRAM(s) Oral before breakfast  pregabalin 75 milliGRAM(s) Oral two times a day    MEDICATIONS  (PRN):  ondansetron Injectable 4 milliGRAM(s) IV Push every 6 hours PRN Nausea      REVIEW OF SYSTEMS:  CONSTITUTIONAL: No fever, weight loss, or fatigue  EYES: No eye pain, visual disturbances, or discharge  ENMT:  No difficulty hearing, tinnitus, vertigo; No sinus or throat pain  NECK: No pain or stiffness  RESPIRATORY: No cough, wheezing, chills or hemoptysis; No shortness of breath  CARDIOVASCULAR: No chest pain, palpitations, dizziness, or leg swelling  GASTROINTESTINAL: No abdominal or epigastric pain. No nausea, vomiting, or hematemesis; No diarrhea or constipation. No melena or hematochezia.  GENITOURINARY: No dysuria, frequency, hematuria, or incontinence  NEUROLOGICAL: No headaches, memory loss, loss of strength, numbness, or tremors  SKIN: No itching, burning, rashes, or lesions   LYMPH NODES: No enlarged glands  ENDOCRINE: No heat or cold intolerance; No hair loss  MUSCULOSKELETAL: No joint pain or swelling; No muscle, back, or extremity pain  PSYCHIATRIC: No depression, anxiety, mood swings, or difficulty sleeping  HEME/LYMPH: No easy bruising, or bleeding gums  ALLERY AND IMMUNOLOGIC: No hives or eczema    PHYSICAL EXAM:  GENERAL: NAD, well-groomed, well-developed  HEAD:  Atraumatic, Normocephalic  EYES: EOMI, PERRLA, conjunctiva and sclera clear  ENMT: No tonsillar erythema, exudates, or enlargement; Moist mucous membranes, Good dentition, No lesions  NECK: Supple, No JVD, Normal thyroid  NERVOUS SYSTEM:  Alert & Oriented X3, Good concentration; Motor Strength 5/5 B/L upper and lower extremities; DTRs 2+ intact and symmetric  CHEST/LUNG: Clear to percussion bilaterally; No rales, rhonchi, wheezing, or rubs  HEART: Regular rate and rhythm; No murmurs, rubs, or gallops  ABDOMEN: Soft, Nontender, Nondistended; Bowel sounds present  EXTREMITIES:  2+ Peripheral Pulses, No clubbing, cyanosis, or edema  LYMPH: No lymphadenopathy noted  SKIN: No rashes or lesions    RADIOLOGY & ADDITIONAL TESTS:    Imaging Personally Reviewed:  [x ] YES  [ ] NO    Consultant(s) Notes Reviewed:  [x ] YES  [ ] NO        Care Discussed with Consultants/Other Providers [ x] YES  [ ] NO

## 2022-09-02 ENCOUNTER — TRANSCRIPTION ENCOUNTER (OUTPATIENT)
Age: 47
End: 2022-09-02

## 2022-09-02 VITALS
SYSTOLIC BLOOD PRESSURE: 123 MMHG | RESPIRATION RATE: 17 BRPM | TEMPERATURE: 98 F | HEART RATE: 79 BPM | DIASTOLIC BLOOD PRESSURE: 89 MMHG | OXYGEN SATURATION: 99 %

## 2022-09-02 PROCEDURE — 86900 BLOOD TYPING SEROLOGIC ABO: CPT

## 2022-09-02 PROCEDURE — C1889: CPT

## 2022-09-02 PROCEDURE — 80048 BASIC METABOLIC PNL TOTAL CA: CPT

## 2022-09-02 PROCEDURE — 86850 RBC ANTIBODY SCREEN: CPT

## 2022-09-02 PROCEDURE — 36415 COLL VENOUS BLD VENIPUNCTURE: CPT

## 2022-09-02 PROCEDURE — 88309 TISSUE EXAM BY PATHOLOGIST: CPT

## 2022-09-02 PROCEDURE — 81025 URINE PREGNANCY TEST: CPT

## 2022-09-02 PROCEDURE — 86901 BLOOD TYPING SEROLOGIC RH(D): CPT

## 2022-09-02 PROCEDURE — 83036 HEMOGLOBIN GLYCOSYLATED A1C: CPT

## 2022-09-02 PROCEDURE — 82962 GLUCOSE BLOOD TEST: CPT

## 2022-09-02 PROCEDURE — 85025 COMPLETE CBC W/AUTO DIFF WBC: CPT

## 2022-09-02 PROCEDURE — C9399: CPT

## 2022-09-02 RX ORDER — PANTOPRAZOLE SODIUM 20 MG/1
1 TABLET, DELAYED RELEASE ORAL
Qty: 0 | Refills: 0 | DISCHARGE

## 2022-09-02 RX ORDER — IBUPROFEN AND FAMOTIDINE 26.6; 8 MG/1; MG/1
1 TABLET, FILM COATED ORAL
Qty: 0 | Refills: 0 | DISCHARGE

## 2022-09-02 RX ORDER — CYCLOBENZAPRINE HYDROCHLORIDE 10 MG/1
1 TABLET, FILM COATED ORAL
Qty: 0 | Refills: 0 | DISCHARGE

## 2022-09-02 RX ORDER — TIZANIDINE 4 MG/1
2 TABLET ORAL
Qty: 0 | Refills: 0 | DISCHARGE

## 2022-09-02 RX ORDER — CELECOXIB 200 MG/1
1 CAPSULE ORAL
Qty: 0 | Refills: 0 | DISCHARGE

## 2022-09-02 RX ORDER — LORATADINE 10 MG/1
1 TABLET ORAL
Qty: 0 | Refills: 0 | DISCHARGE

## 2022-09-02 RX ORDER — OXYCODONE HYDROCHLORIDE 5 MG/1
1 TABLET ORAL
Qty: 6 | Refills: 0
Start: 2022-09-02

## 2022-09-02 RX ORDER — TRAMADOL HYDROCHLORIDE 50 MG/1
1 TABLET ORAL
Qty: 6 | Refills: 0
Start: 2022-09-02

## 2022-09-02 RX ADMIN — Medication 75 MILLIGRAM(S): at 06:51

## 2022-09-02 RX ADMIN — HEPARIN SODIUM 5000 UNIT(S): 5000 INJECTION INTRAVENOUS; SUBCUTANEOUS at 06:50

## 2022-09-02 RX ADMIN — PANTOPRAZOLE SODIUM 40 MILLIGRAM(S): 20 TABLET, DELAYED RELEASE ORAL at 06:50

## 2022-09-02 RX ADMIN — ESCITALOPRAM OXALATE 5 MILLIGRAM(S): 10 TABLET, FILM COATED ORAL at 13:04

## 2022-09-02 NOTE — DISCHARGE NOTE PROVIDER - HOSPITAL COURSE
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 Pt tolerated the procedure well, remained hemodynamically stable postoperatively. Diet slowly advanced, well tolerated. On POD#2, pt deemed safe for discharge home with follow up instructions.

## 2022-09-02 NOTE — PROGRESS NOTE ADULT - ASSESSMENT
47y Female s/p Laparoscopic Partial Gastrectomy 8/31    -Advance diet to clears   -Pain control PRN  -C/w Home meds  -IVF  -Incentive spirometer  -OOB/Ambulate  -DVT ppx   -F/u Pathology 
47y Female s/p Laparoscopic Partial Gastrectomy 8/31, stable     -Pain control PRN  -C/w Home meds  -IVF  -NPO  -Advance diet in AM   -Incentive spirometer  -OOB/Ambulate  -DVT ppx   -F/u Pathology 
47y.o. Female s/p lap partial gastrectomy POD#2    -Advance to full liquid diet  -OOB ambulate  -Pain control prn  -d/c planning
OOB  PO fluids.
Observation  OOB

## 2022-09-02 NOTE — DISCHARGE NOTE PROVIDER - NSDCCPTREATMENT_GEN_ALL_CORE_FT
PRINCIPAL PROCEDURE  Procedure: Laparoscopic partial gastrectomy  Findings and Treatment: - do not take steri-strips off, allow them to fall off on their own  - do not lift anything heavier than 10 lbs for 2-4 weeks, avoid strenuous activity for 2 weeks  - take over the counter medications for pain, Tylenol 650mg every 8 hours with food  - DO NOT TAKE IBUPROFEN or NSAIDs (including celecoxib) until cleared by surgeon  - take tramadol 25mg to 50mg every 8 hours only for severe pain  - shower as necessary, do not soak or bathe until cleared by surgeon  - follow up with surgeon in 1-2 weeks, call to schedule an appointment

## 2022-09-02 NOTE — DISCHARGE NOTE PROVIDER - NSDCMRMEDTOKEN_GEN_ALL_CORE_FT
escitalopram 5 mg oral tablet: 2 tab(s) orally once a day  famotidine 40 mg oral tablet: 1 tab(s) orally once a day (at bedtime)  fluticasone 50 mcg/inh inhalation powder: 1 puff(s) inhaled 2 times a day  montelukast 10 mg oral tablet: 1 tab(s) orally once a day  pregabalin 75 mg oral capsule: 1 cap(s) orally 2 times a day   escitalopram 5 mg oral tablet: 2 tab(s) orally once a day  famotidine 40 mg oral tablet: 1 tab(s) orally once a day (at bedtime)  fluticasone 50 mcg/inh inhalation powder: 1 puff(s) inhaled 2 times a day  montelukast 10 mg oral tablet: 1 tab(s) orally once a day  pregabalin 75 mg oral capsule: 1 cap(s) orally 2 times a day  traMADol 50 mg oral tablet: 1 tab(s) orally 3 times a day MDD:3   escitalopram 5 mg oral tablet: 2 tab(s) orally once a day  famotidine 40 mg oral tablet: 1 tab(s) orally once a day (at bedtime)  fluticasone 50 mcg/inh inhalation powder: 1 puff(s) inhaled 2 times a day  montelukast 10 mg oral tablet: 1 tab(s) orally once a day  oxyCODONE 5 mg oral tablet: 1 tab(s) orally every 8 hours MDD:3  pregabalin 75 mg oral capsule: 1 cap(s) orally 2 times a day

## 2022-09-02 NOTE — DISCHARGE NOTE PROVIDER - CARE PROVIDER_API CALL
Devyn Matias)  Surgery  [address] [facility], [place] [address]  Phone: [phone]  Fax: [phone]  Follow Up Time: 2 weeks

## 2022-09-02 NOTE — PROGRESS NOTE ADULT - SUBJECTIVE AND OBJECTIVE BOX
INTERVAL HPI/OVERNIGHT EVENTS:    Pt seen and examined at bedside. Admits to incisional pain, denies nausea or vomiting. +f/ no BM   Pt is NPO.     Vital Signs Last 24 Hrs  T(C): 36.8 (01 Sep 2022 05:25), Max: 37.5 (31 Aug 2022 21:29)  T(F): 98.2 (01 Sep 2022 05:25), Max: 99.5 (31 Aug 2022 21:29)  HR: 85 (01 Sep 2022 05:25) (85 - 104)  BP: 123/77 (01 Sep 2022 05:25) (112/92 - 144/88)  BP(mean): 100 (31 Aug 2022 14:40) (97 - 106)  RR: 18 (01 Sep 2022 05:25) (16 - 24)  SpO2: 99% (01 Sep 2022 05:25) (97% - 100%)    Parameters below as of 01 Sep 2022 05:25  Patient On (Oxygen Delivery Method): room air      I&O's Detail    31 Aug 2022 07:01  -  01 Sep 2022 07:00  --------------------------------------------------------  IN:  Total IN: 0 mL    OUT:    Voided (mL): 400 mL  Total OUT: 400 mL    Total NET: -400 mL        pantoprazole    Tablet 40 milliGRAM(s) Oral before breakfast      Physical Exam  General: AAOx3, No acute distress  Skin: No jaundice, no icterus  Abdomen: soft,  nondistended, incisional TTP, dressing c/d/i, no rebound tenderness, no guarding, no palpable masses  Extremities: non edematous, no calf pain bilaterally        Labs:                        11.7   12.08 )-----------( 220      ( 01 Sep 2022 05:35 )             35.4     09-01    140  |  108  |  9   ----------------------------<  123<H>  3.9   |  24  |  0.62    Ca    8.0<L>      01 Sep 2022 05:35        
Surgery Post-Op Note    Pt seen and examined at bedside. Admits to incisional pain, denies nausea or vomiting. No fever, chills, SOB or CP.   Pt is NPO.       Vital Signs Last 24 Hrs  T(C): 36.6 (31 Aug 2022 16:07), Max: 36.9 (31 Aug 2022 08:02)  T(F): 97.8 (31 Aug 2022 16:07), Max: 98.4 (31 Aug 2022 08:02)  HR: 93 (31 Aug 2022 16:07) (75 - 104)  BP: 132/76 (31 Aug 2022 16:07) (112/92 - 144/88)  BP(mean): 104 (31 Aug 2022 14:04) (97 - 106)  RR: 18 (31 Aug 2022 16:07) (16 - 24)  SpO2: 99% (31 Aug 2022 16:07) (98% - 100%)    Parameters below as of 31 Aug 2022 16:07  Patient On (Oxygen Delivery Method): room air        Physical Exam:   GEN: AAOx3, No acute distress  ABD: Soft, ND, incisional TTP, dressing C/D/I   Extremities: non edematous, no calf pain bilaterally            
INTERVAL HPI/OVERNIGHT EVENTS:  Pt stable.   NPO  Voided    Vital Signs Last 24 Hrs  T(C): 36.6 (31 Aug 2022 16:07), Max: 36.9 (31 Aug 2022 08:02)  T(F): 97.8 (31 Aug 2022 16:07), Max: 98.4 (31 Aug 2022 08:02)  HR: 93 (31 Aug 2022 16:07) (75 - 104)  BP: 132/76 (31 Aug 2022 16:07) (112/92 - 144/88)  BP(mean): 104 (31 Aug 2022 14:04) (97 - 106)  RR: 18 (31 Aug 2022 16:07) (16 - 24)  SpO2: 99% (31 Aug 2022 16:07) (98% - 100%)    Parameters below as of 31 Aug 2022 16:07  Patient On (Oxygen Delivery Method): room air        Physical:  Abdomen: Soft nondistended, nontender.  Port sites clean.      I&O's Summary      LABS:                
INTERVAL HPI/OVERNIGHT EVENTS:  Pt stable.   NPO  flatus, no BM.  C/O incisional pain.    Vital Signs Last 24 Hrs  T(C): 36.8 (01 Sep 2022 05:25), Max: 37.5 (31 Aug 2022 21:29)  T(F): 98.2 (01 Sep 2022 05:25), Max: 99.5 (31 Aug 2022 21:29)  HR: 85 (01 Sep 2022 05:25) (85 - 104)  BP: 123/77 (01 Sep 2022 05:25) (112/92 - 144/88)  BP(mean): 100 (31 Aug 2022 14:40) (97 - 106)  RR: 18 (01 Sep 2022 05:25) (16 - 24)  SpO2: 99% (01 Sep 2022 05:25) (97% - 100%)    Parameters below as of 01 Sep 2022 05:25  Patient On (Oxygen Delivery Method): room air        Physical:  Abdomen: Soft nondistended, nontender.  Port sites clean.    I&O's Summary    31 Aug 2022 07:01  -  01 Sep 2022 07:00  --------------------------------------------------------  IN: 0 mL / OUT: 400 mL / NET: -400 mL        LABS:                        11.7   12.08 )-----------( 220      ( 01 Sep 2022 05:35 )             35.4             09-01    140  |  108  |  9   ----------------------------<  123<H>  3.9   |  24  |  0.62    Ca    8.0<L>      01 Sep 2022 05:35    
INTERVAL HPI/OVERNIGHT EVENTS:  Pt resting comfortably. No acute complaints.   Tolerating clear liquid diet.   +flatus/-BM.   Denies N/V.    MEDICATIONS  (STANDING):  dextrose 5% + sodium chloride 0.9% with potassium chloride 20 mEq/L 1000 milliLiter(s) (125 mL/Hr) IV Continuous <Continuous>  escitalopram 5 milliGRAM(s) Oral daily  heparin   Injectable 5000 Unit(s) SubCutaneous every 8 hours  pantoprazole    Tablet 40 milliGRAM(s) Oral before breakfast  pregabalin 75 milliGRAM(s) Oral two times a day    MEDICATIONS  (PRN):  ondansetron Injectable 4 milliGRAM(s) IV Push every 6 hours PRN Nausea      Vital Signs Last 24 Hrs  T(C): 37.2 (02 Sep 2022 06:00), Max: 37.3 (01 Sep 2022 21:37)  T(F): 98.9 (02 Sep 2022 06:00), Max: 99.2 (01 Sep 2022 21:37)  HR: 81 (02 Sep 2022 06:00) (75 - 81)  BP: 133/82 (02 Sep 2022 06:00) (126/73 - 133/87)  BP(mean): --  RR: 18 (02 Sep 2022 06:00) (18 - 18)  SpO2: 100% (02 Sep 2022 06:00) (97% - 100%)    Physical:  General: A&Ox3. NAD.  Abdomen: Soft nondistended, Dressing C/D/I.    I&O's Detail    01 Sep 2022 07:01  -  02 Sep 2022 07:00  --------------------------------------------------------  IN:    dextrose 5% + sodium chloride 0.9% w/ Additives: 3000 mL  Total IN: 3000 mL    OUT:  Total OUT: 0 mL    Total NET: 3000 mL    LABS:                        11.7   12.08 )-----------( 220      ( 01 Sep 2022 05:35 )             35.4             09-01    140  |  108  |  9   ----------------------------<  123<H>  3.9   |  24  |  0.62    Ca    8.0<L>      01 Sep 2022 05:35    
  pt seen in icu [  ], reg med floor [ x  ], bed [ x ], chair at bedside [   ]  Awake, alert, laying in bed in NAD. Tolerating clear liquids. +flatus but no BMs as yet  REVIEW OF SYSTEMS:    CONSTITUTIONAL: No weakness, fevers or chills  EYES/ENT: No visual changes;  No vertigo or throat pain   NECK: No pain or stiffness  RESPIRATORY: No cough, wheezing, hemoptysis; No shortness of breath  CARDIOVASCULAR: No chest pain or palpitations  GASTROINTESTINAL: No abdominal or epigastric pain. No nausea, vomiting, or hematemesis; No diarrhea or constipation. No melena or hematochezia.  GENITOURINARY: No dysuria, frequency or hematuria  NEUROLOGICAL: No numbness or weakness  SKIN: No itching, burning, rashes, or lesions   All other review of systems is negative unless indicated above.    Physical Exam    General: WN/WD NAD  Neurology: A&Ox3, nonfocal, GOMEZ x 4  Respiratory: CTA B/L  CV: RRR, S1S2, no murmurs, rubs or gallops  Abdominal: Soft, incisional tenderness, ND +BS, Last BM  Extremities: No edema, + peripheral pulses      Allergies  Advil (Other)  penicillin (Other)  tramadol (Other)      Health Issues  Other benign neoplasm of connective and other soft tissue of abdomen    No pertinent past medical history    H/O gastritis    Seasonal allergies    Hyperlipidemia    H/O  section    Endometrial polyp    Arthritis    Diverticulosis    H/O  section    S/P cervical polypectomy    History of cholecystectomy    H/O tubal ligation        Vitals  T(F): 98.9 (22 @ 06:00), Max: 99.2 (22 @ 21:37)  HR: 81 (22 @ 06:00) (75 - 81)  BP: 133/82 (22 @ 06:00) (126/73 - 133/87)  RR: 18 (22 @ 06:00) (18 - 18)  SpO2: 100% (22 @ 06:00) (97% - 100%)  Wt(kg): --  CAPILLARY BLOOD GLUCOSE          Labs                          11.7   12.08 )-----------( 220      ( 01 Sep 2022 05:35 )             35.4           140  |  108  |  9   ----------------------------<  123<H>  3.9   |  24  |  0.62    Ca    8.0<L>      01 Sep 2022 05:35              Radiology Results      Meds    MEDICATIONS  (STANDING):  dextrose 5% + sodium chloride 0.9% with potassium chloride 20 mEq/L 1000 milliLiter(s) (125 mL/Hr) IV Continuous <Continuous>  escitalopram 5 milliGRAM(s) Oral daily  heparin   Injectable 5000 Unit(s) SubCutaneous every 8 hours  pantoprazole    Tablet 40 milliGRAM(s) Oral before breakfast  pregabalin 75 milliGRAM(s) Oral two times a day      MEDICATIONS  (PRN):  ondansetron Injectable 4 milliGRAM(s) IV Push every 6 hours PRN Nausea

## 2022-09-02 NOTE — PROGRESS NOTE ADULT - PROBLEM SELECTOR PLAN 1
S/p lap partial gastrectomy  incentive spirometry  pain control  Surgical follow up  Advance diet  early ambulation

## 2022-09-02 NOTE — DISCHARGE NOTE NURSING/CASE MANAGEMENT/SOCIAL WORK - PATIENT PORTAL LINK FT
You can access the FollowMyHealth Patient Portal offered by [facility] by registering at the following website: [email or web address]. By joining [facility]’s FollowMyHealth portal, you will also be able to view your health information using other applications (apps) compatible with our system.

## 2022-09-06 PROBLEM — K57.90 DIVERTICULOSIS OF INTESTINE, PART UNSPECIFIED, WITHOUT PERFORATION OR ABSCESS WITHOUT BLEEDING: Chronic | Status: ACTIVE | Noted: 2022-08-12

## 2022-09-13 PROBLEM — K31.7 POLYP, STOMACH: Status: ACTIVE | Noted: 2022-06-27

## 2022-09-14 LAB — SURGICAL PATHOLOGY STUDY: SIGNIFICANT CHANGE UP

## 2022-09-15 ENCOUNTER — APPOINTMENT (OUTPATIENT)
Dept: SURGERY | Facility: CLINIC | Age: 47
End: 2022-09-15

## 2022-09-15 DIAGNOSIS — K31.7 POLYP OF STOMACH AND DUODENUM: ICD-10-CM

## 2022-09-15 PROCEDURE — 99024 POSTOP FOLLOW-UP VISIT: CPT

## 2022-09-15 NOTE — PLAN
[FreeTextEntry1] : patient will follow up in 3 months or if needed. Warning signs, follow up, and restrictions were discussed with the patient. \par \par consult  Dr Shashi Daugherty \par follow up with Dr Hernandez for questionable lung nodule \par

## 2022-09-15 NOTE — ASSESSMENT
[FreeTextEntry1] : Ms. ORTIZ is doing well, with excellent post-operative recovery. All surgical incisions are healing well and as expected. There is no evidence of infection or complication, and she is progressing as expected. Post-operative wound care, activity, restrictions and precautions reinforced. Patient instructed to refrain from any heavy lifting greater than 10-15 pounds for at least 4 weeks post-operatively. pathology results were discussed in details.  she was advised Dr Shashi Daugherty . Patient's questions and concerns addressed to patient's satisfaction.

## 2022-09-15 NOTE — PHYSICAL EXAM
[Abdominal Masses] : No abdominal masses [Abdomen Tenderness] : ~T ~M No abdominal tenderness [Alert] : alert [Oriented to Person] : oriented to person [Oriented to Place] : oriented to place [Oriented to Time] : oriented to time [Calm] : calm [de-identified] : She  is alert, well-groomed, and in NAD\par   [de-identified] : anicteric.  Nasal mucosa pink, septum midline. Oral mucosa pink.  Tongue midline, Pharynx without exudates.\par   [de-identified] : Neck supple. Trachea midline. Thyroid isthmus barely palpable, lobes not felt.\par   [de-identified] : Surgical wounds  healed well.   no signs of  inflammation or infection.

## 2022-09-15 NOTE — DATA REVIEWED
[FreeTextEntry1] :  [facility] Accession Number : [id number]\par Patient:   RADHA ORTIZ\par \par \par Accession:                             [id number]\par \par Collected Date/Time:                   8/31/2022 16:00 EDT\par Received Date/Time:                    9/1/2022 11:47 EDT\par \par Surgical Pathology Report - Auth (Verified)\par \par Specimen(s) Submitted\par 1  Segment of stomach\par \par Final Diagnosis\par \par Segment of stomach; laparoscopic partial gastric resection:\par - Gastrointestinal stromal tumor, spindle cell type, involving the wall\par of the gastric body, measuring 3.1 cm (pT2) and located 1 cm from the\par \par nearest stapled mucosal resection margin.\par - The tumor is of low grade, G1 (mitotic rate 2-3/ 5    mm2) and the risk\par assessment is very low (1.9%).\par - Tattoo pigment is identified in the submucosa corresponding to the\par tumor location.\par - See synoptic report.\par Verified by: Jazmin Wolf MD\par (Electronic Signature)\par Reported on: 09/14/22 16:10 EDT, [address]\par Phone: [phone]   Fax: [phone]\par _________________________________________________________________\par \par \par Synoptic Summary\par \par Protocol Posting Date: June 2021\par \par CASE SUMMARY: (GASTROINTESTINAL STROMAL TUMOR (GIST): Resection)\par Standard(s) : AJCC-UICC 8\par \par CLINICAL\par \par +Preresection Treatment\par No known preresection therapy\par \par

## 2022-09-15 NOTE — HISTORY OF PRESENT ILLNESS
[de-identified] : Ms. ORTIZ  is s/p Laparoscopic partial gastric resection on 08/31/2022.   The patient was found to have a GIST tumor towards the posterior aspect of the body of the stomach, proximal, close to the greater curvature. Patient's pathology results were  consistent with - Gastrointestinal stromal tumor, spindle cell type, involving the wall\par of the gastric body, measuring 3.1 cm (pT2) and located 1 cm from the  nearest stapled mucosal resection margin.  The tumor is of low grade, G1 (mitotic rate 2-3/ 5    mm2) and the risk assessment is very low (1.9%).  Today  Ms. ORTIZ offers no complaints. patient reports no fever or  chills. patient reports occasional discomfort in the surgical area.  Her surgical incisions are healing well. No signs of inflammation, infection or exudate. patient reports good bowel movements and appetite.

## 2022-10-01 NOTE — H&P PST ADULT - LAST PROSTATE EXAM
Patient c/o pain rating 8/10 he describes as \"aching all over my body\", he also rates his burning with urination at 8/10 pain. MD was notified no orders received at this time. N/A

## 2022-10-17 ENCOUNTER — APPOINTMENT (OUTPATIENT)
Dept: GASTROENTEROLOGY | Facility: CLINIC | Age: 47
End: 2022-10-17

## 2022-10-17 VITALS
SYSTOLIC BLOOD PRESSURE: 120 MMHG | BODY MASS INDEX: 29.77 KG/M2 | HEART RATE: 85 BPM | RESPIRATION RATE: 16 BRPM | TEMPERATURE: 98.5 F | WEIGHT: 168 LBS | DIASTOLIC BLOOD PRESSURE: 82 MMHG | OXYGEN SATURATION: 98 % | HEIGHT: 63 IN

## 2022-10-17 DIAGNOSIS — K59.00 CONSTIPATION, UNSPECIFIED: ICD-10-CM

## 2022-10-17 PROCEDURE — 99213 OFFICE O/P EST LOW 20 MIN: CPT

## 2022-10-17 RX ORDER — POLYETHYLENE GLYCOL 3350 17 G/17G
17 POWDER, FOR SOLUTION ORAL DAILY
Qty: 1 | Refills: 5 | Status: ACTIVE | COMMUNITY
Start: 2022-10-17 | End: 1900-01-01

## 2022-10-17 NOTE — PHYSICAL EXAM

## 2022-10-17 NOTE — ASSESSMENT
[FreeTextEntry1] : 47F with pmhx of asthma, GERD, OA presenting for follow-up. Recently diagnosed with gastric GIST, underwent laparoscopic partial gastrectomy, path showed low grade GIST, clear margins. Pt states doing well post surgery but does note constipation has worsened over the last few weeks. Has not had a BM in 3-4 days. Associated straining. Has been taking glycerin suppositories and dulcolax with only partial relief. Has not tried any other therapy. Denies any abd pain, n/v/d/c, melena, hematochezia, fever/chills, or other issues. Notes last colonoscopy with Dr. Eric Crawford within last year showing diverticulosis and hemorrhoids. \par - Trial Miralax 17g orally once or twice daily for constipation.\par - Follow-up with primary GI Dr. Crawford as planned. \par - RTC PRN.

## 2022-10-17 NOTE — HISTORY OF PRESENT ILLNESS
[FreeTextEntry1] : 47F with pmhx of asthma, GERD, OA presenting for follow-up. Recently diagnosed with gastric GIST, underwent laparoscopic partial gastrectomy, path showed low grade GIST, clear margins. Pt states doing well post surgery but does note constipation has worsened over the last few weeks. Has not had a BM in 3-4 days. Associated straining. Has been taking glycerin suppositories and dulcolax with only partial relief. Has not tried any other therapy. Denies any abd pain, n/v/d/c, melena, hematochezia, fever/chills, or other issues. Notes last colonoscopy with Dr. Eric Crawford within last year showing diverticulosis and hemorrhoids.

## 2022-12-19 ENCOUNTER — APPOINTMENT (OUTPATIENT)
Dept: SURGERY | Facility: CLINIC | Age: 47
End: 2022-12-19

## 2022-12-19 VITALS
DIASTOLIC BLOOD PRESSURE: 91 MMHG | TEMPERATURE: 97.3 F | BODY MASS INDEX: 30.83 KG/M2 | HEIGHT: 63 IN | HEART RATE: 82 BPM | SYSTOLIC BLOOD PRESSURE: 146 MMHG | WEIGHT: 174 LBS

## 2022-12-19 DIAGNOSIS — C49.A2 GASTROINTESTINAL STROMAL TUMOR OF STOMACH: ICD-10-CM

## 2022-12-19 PROCEDURE — 99213 OFFICE O/P EST LOW 20 MIN: CPT

## 2022-12-19 NOTE — ASSESSMENT
[FreeTextEntry1] : Ms. ORTIZ is doing well, with excellent post-operative recovery. All surgical incisions  healed well and as expected. There is no evidence of  complication or recurrence, and she is progressing as expected. Patient can return to normal activity without restrictions.  PAtient was advised to loose weight. I reviewed importance of behavioral modification. Nutrition was  reviewed and reinforced, including the importance  of making healthy food choices. The importance of maintaining regular exercise/physical activity also reinforced. Recommend patient to see nutritionist. Patient's questions and concerns addressed to patient's satisfaction. 
negative

## 2022-12-19 NOTE — PHYSICAL EXAM
[Abdominal Masses] : No abdominal masses [Abdomen Tenderness] : ~T ~M No abdominal tenderness [Alert] : alert [Oriented to Person] : oriented to person [Oriented to Place] : oriented to place [Oriented to Time] : oriented to time [Calm] : calm [de-identified] : She  is alert, well-groomed, and in NAD\par   [de-identified] : anicteric.  Nasal mucosa pink, septum midline. Oral mucosa pink.  Tongue midline, Pharynx without exudates.\par   [de-identified] : Neck supple. Trachea midline. Thyroid isthmus barely palpable, lobes not felt.\par   [de-identified] : Surgical wounds  healed well.   no signs of  inflammation or infection.  no palpable hernias

## 2022-12-19 NOTE — HISTORY OF PRESENT ILLNESS
[de-identified] : Ms. ORTIZ  is s/p Laparoscopic partial gastric resection on 08/31/2022.   The patient was found to have a GIST tumor towards the posterior aspect of the body of the stomach, proximal, close to the greater curvature. Today  Ms. ORTIZ offers no complaints. patient reports no fever or  chills. patient reports occasional discomfort in the  left upper quadrant incision .   patient reports good bowel movements and appetite.  she consulted Dr Shashi Daughertyand is scheduled to see him in January

## 2022-12-19 NOTE — PLAN
[FreeTextEntry1] : Ms. ORTIZ will follow up  if needed. Warning signs, follow up, and restrictions were discussed with the patient.

## 2023-08-16 LAB — SARS-COV-2 N GENE NPH QL NAA+PROBE: NOT DETECTED

## 2023-08-22 NOTE — ASU PREOP CHECKLIST - BP NONINVASIVE SYSTOLIC (MM HG)
Alert-The patient is alert, awake and responds to voice. The patient is oriented to time, place, and person. The triage nurse is able to obtain subjective information.
121

## 2023-11-17 NOTE — H&P PST ADULT - FALL HARM RISK - PT AGE POPULATION HIDDEN
Unknown how pt was eating PTA. Unknown if pt followed therapeutic diet. Unknown if pt with chewing/swallowing issues. Unknown if pt took oral nutrition supplements or micronutrients. NKFA. 
Adult

## 2023-12-21 NOTE — ED ADULT NURSE NOTE - TEMPLATE LIST FOR HEAD TO TOE ASSESSMENT
18 Abdominal Pain, N/V/D Skin Substitute Text: The defect edges were debeveled with a #15 scalpel blade.  Given the location of the defect, shape of the defect and the proximity to free margins a skin substitute graft was deemed most appropriate.  The graft material was trimmed to fit the size of the defect. The graft was then placed in the primary defect and oriented appropriately.

## 2024-02-02 NOTE — BRIEF OPERATIVE NOTE - NSICDXBRIEFPOSTOP_GEN_ALL_CORE_FT
Patient would like communication of their results via:    Cell Phone:   Telephone Information:   Mobile [phone]     Okay to leave a message containing results? Yes     Health Maintenance Due   Topic Date Due    Hepatitis B Vaccine (1 of 3 - 3-dose series) Never done    COVID-19 Vaccine (1) Never done    Varicella Vaccine (1 of 2 - 2-dose childhood series) Never done    Depression Screening  Never done    DTaP/Tdap/Td Vaccine (1 - Tdap) 10/08/2004    Influenza Vaccine (1) Never done                    POST-OP DIAGNOSIS:  Biliary dyskinesia 22-Jun-2021 10:20:47  Ilda Escalera

## 2024-08-14 NOTE — ASU PREOP CHECKLIST - IV STARTED
Post-Op Assessment Note    CV Status:  Stable  Pain Score: 0    Pain management: adequate       Mental Status:  Alert and awake   Hydration Status:  Euvolemic   PONV Controlled:  Controlled   Airway Patency:  Patent     Post Op Vitals Reviewed: Yes    No anethesia notable event occurred.    Staff: Anesthesiologist, CRNA           /59 (08/14/24 0755)    Temp 97.7 °F (36.5 °C) (08/14/24 0755)    Pulse 86 (08/14/24 0755)   Resp 13 (08/14/24 0755)    SpO2 99 % (08/14/24 0755)    
yes

## (undated) DEVICE — NDL INJ SCLERO INTERJECT 23G

## (undated) DEVICE — Device

## (undated) DEVICE — WRAP COMPRESSION CALF MED

## (undated) DEVICE — SUT SOFSILK 0 18" TIES

## (undated) DEVICE — FOR-ESU VALLEYLAB T7E14999DX: Type: DURABLE MEDICAL EQUIPMENT

## (undated) DEVICE — SUT SOFSILK 2-0 30" V-20

## (undated) DEVICE — CATH BLLN ULTRASONIC ENSOSCOPE

## (undated) DEVICE — PACK MAJOR ABDOMINAL WITH LAP

## (undated) DEVICE — BITE BLOCK SCOPE SAVER 20X27MM ADULT GREEN

## (undated) DEVICE — DRSG TRACH DRAINAGE 4X4

## (undated) DEVICE — FOLEY TRAY 16FR SURESTEP LTX BG

## (undated) DEVICE — GLV 7 PROTEXIS (WHITE)

## (undated) DEVICE — DRSG MASTISOL

## (undated) DEVICE — MARKER ENDO SPOT EX

## (undated) DEVICE — SOL IRR POUR NS 0.9% 1500ML

## (undated) DEVICE — LIGASURE BLUNT TIP 37CM

## (undated) DEVICE — DRAPE LIGHT HANDLE COVER (BLUE)

## (undated) DEVICE — NDL ACQUIRE EUS FNB 22G

## (undated) DEVICE — WARMING BLANKET FULL ADULT

## (undated) DEVICE — DRSG MEDIPORE DRESS IT 5-7/8 X 11"

## (undated) DEVICE — ELCTR GROUNDING PAD ADULT COVIDIEN

## (undated) DEVICE — LIGASURE IMPACT

## (undated) DEVICE — STAPLER SKIN VISI-STAT 35 WIDE

## (undated) DEVICE — NDL ENDO ASPIRATION SLIMLINE HDL 25G

## (undated) DEVICE — SUT MAXON 1 96" T-60

## (undated) DEVICE — TROCAR COVIDIEN VERSAONE BLADED SMOOTH 12MM STANDARD

## (undated) DEVICE — SOL IRR POUR H2O 1500ML

## (undated) DEVICE — SUT POLYSORB 2-0 30" V-20 UNDYED

## (undated) DEVICE — KIT ENDO PROCEDURE CUST W/VLV

## (undated) DEVICE — SUT SOFSILK 2-0 30" TIES

## (undated) DEVICE — SOL INJ NS 0.9% 500ML 1-PORT

## (undated) DEVICE — SUT SOFSILK 3-0 30" V-20

## (undated) DEVICE — SUT SOFSILK 2-0 18" C-15

## (undated) DEVICE — VENODYNE/SCD SLEEVE CALF MEDIUM

## (undated) DEVICE — DRSG CURITY GAUZE SPONGE 4 X 4" 12-PLY

## (undated) DEVICE — TUBING CANNULA SALTER LABS NASAL ADULT 7FT

## (undated) DEVICE — GLV 7.5 PROTEXIS (WHITE)

## (undated) DEVICE — STAPLER COVIDIEN ENDO GIA STANDARD HANDLE

## (undated) DEVICE — ELCTR BOVIE BLADE 3/4" EXTENDED LENGTH 6"

## (undated) DEVICE — DRAIN RESERVOIR FOR JACKSON PRATT 100CC CARDINAL